# Patient Record
Sex: FEMALE | Race: ASIAN | NOT HISPANIC OR LATINO | Employment: OTHER | URBAN - METROPOLITAN AREA
[De-identification: names, ages, dates, MRNs, and addresses within clinical notes are randomized per-mention and may not be internally consistent; named-entity substitution may affect disease eponyms.]

---

## 2017-02-21 DIAGNOSIS — E03.9 ACQUIRED HYPOTHYROIDISM: ICD-10-CM

## 2017-02-21 DIAGNOSIS — E78.00 PURE HYPERCHOLESTEROLEMIA: ICD-10-CM

## 2017-02-21 DIAGNOSIS — Z76.0 ENCOUNTER FOR MEDICATION REFILL: Primary | ICD-10-CM

## 2017-02-21 DIAGNOSIS — E78.00 ELEVATED CHOLESTEROL: ICD-10-CM

## 2017-02-21 DIAGNOSIS — I10 BENIGN ESSENTIAL HYPERTENSION: ICD-10-CM

## 2017-02-21 RX ORDER — LEVOTHYROXINE SODIUM 25 UG/1
TABLET ORAL
Qty: 90 TABLET | Refills: 1 | Status: SHIPPED | OUTPATIENT
Start: 2017-02-21 | End: 2017-05-24

## 2017-02-21 RX ORDER — AMLODIPINE BESYLATE 5 MG/1
5 TABLET ORAL DAILY
Qty: 90 TABLET | Refills: 1 | Status: SHIPPED | OUTPATIENT
Start: 2017-02-21 | End: 2017-08-23

## 2017-02-22 RX ORDER — SIMVASTATIN 20 MG
TABLET ORAL
Qty: 90 TABLET | Refills: 1 | Status: SHIPPED | OUTPATIENT
Start: 2017-02-22 | End: 2017-08-23

## 2017-05-24 DIAGNOSIS — E03.9 ACQUIRED HYPOTHYROIDISM: ICD-10-CM

## 2017-05-24 RX ORDER — LEVOTHYROXINE SODIUM 25 UG/1
TABLET ORAL
Qty: 90 TABLET | Refills: 0 | Status: SHIPPED | OUTPATIENT
Start: 2017-05-24 | End: 2017-11-27

## 2017-05-25 DIAGNOSIS — Z76.0 ENCOUNTER FOR MEDICATION REFILL: Primary | ICD-10-CM

## 2017-05-26 RX ORDER — TRAMADOL HYDROCHLORIDE 50 MG/1
TABLET ORAL
Qty: 30 TABLET | Refills: 0 | Status: SHIPPED | OUTPATIENT
Start: 2017-05-26 | End: 2017-08-24

## 2017-05-30 DIAGNOSIS — N89.8 VAGINAL DRYNESS: ICD-10-CM

## 2017-05-31 RX ORDER — ESTRADIOL 0.1 MG/G
2 CREAM VAGINAL
Qty: 42.5 G | Refills: 10 | Status: SHIPPED | OUTPATIENT
Start: 2017-06-01 | End: 2021-05-19

## 2017-06-23 ENCOUNTER — TELEPHONE (OUTPATIENT)
Dept: FAMILY MEDICINE | Facility: CLINIC | Age: 62
End: 2017-06-23

## 2017-06-23 NOTE — TELEPHONE ENCOUNTER
Received documentation from [facility] that they have not been able to schedule a 6 month follow up on her mammogram from November.  Per patient she is not going to schedule anything now.  She is going to wait 1 year.  She is still paying off her charges from the November mammogram.  She can not afford any more charges at this time.    Kumar Jacinto,   [facility]  [phone]

## 2017-08-23 DIAGNOSIS — E78.00 ELEVATED CHOLESTEROL: ICD-10-CM

## 2017-08-23 DIAGNOSIS — I10 BENIGN ESSENTIAL HYPERTENSION: ICD-10-CM

## 2017-08-23 DIAGNOSIS — E78.00 PURE HYPERCHOLESTEROLEMIA: ICD-10-CM

## 2017-08-23 RX ORDER — AMLODIPINE BESYLATE 5 MG/1
TABLET ORAL
Qty: 90 TABLET | Refills: 0 | Status: SHIPPED | OUTPATIENT
Start: 2017-08-23 | End: 2017-11-27

## 2017-08-23 RX ORDER — SIMVASTATIN 20 MG
TABLET ORAL
Qty: 90 TABLET | Refills: 0 | Status: SHIPPED | OUTPATIENT
Start: 2017-08-23 | End: 2017-11-27

## 2017-08-24 DIAGNOSIS — Z76.0 ENCOUNTER FOR MEDICATION REFILL: ICD-10-CM

## 2017-08-24 RX ORDER — TRAMADOL HYDROCHLORIDE 50 MG/1
TABLET ORAL
Qty: 30 TABLET | Refills: 0 | Status: SHIPPED | OUTPATIENT
Start: 2017-08-24 | End: 2017-12-29

## 2017-09-13 DIAGNOSIS — Z76.0 ENCOUNTER FOR MEDICATION REFILL: ICD-10-CM

## 2017-09-13 RX ORDER — TRAMADOL HYDROCHLORIDE 50 MG/1
TABLET ORAL
Qty: 30 TABLET | Refills: 0 | OUTPATIENT
Start: 2017-09-13

## 2017-11-07 VITALS — SYSTOLIC BLOOD PRESSURE: 102 MMHG | DIASTOLIC BLOOD PRESSURE: 58 MMHG

## 2017-11-07 DIAGNOSIS — Z23 NEED FOR PROPHYLACTIC VACCINATION AND INOCULATION AGAINST INFLUENZA: Primary | ICD-10-CM

## 2017-11-07 PROCEDURE — 90686 IIV4 VACC NO PRSV 0.5 ML IM: CPT | Performed by: FAMILY MEDICINE

## 2017-11-07 PROCEDURE — 90471 IMMUNIZATION ADMIN: CPT | Performed by: FAMILY MEDICINE

## 2017-11-07 NOTE — PROGRESS NOTES
Injectable Influenza Immunization Documentation    1.  Is the person to be vaccinated sick today?   No    2. Does the person to be vaccinated have an allergy to a component   of the vaccine?   No  Egg Allergy Algorithm Link does not eat eggs    3. Has the person to be vaccinated ever had a serious reaction   to influenza vaccine in the past?   No    4. Has the person to be vaccinated ever had Guillain-Barré syndrome?   No    Form completed by Jacqueline Suarez MA November 7, 2017 2:37 PM

## 2017-11-13 ENCOUNTER — HOSPITAL ENCOUNTER (OUTPATIENT)
Dept: MAMMOGRAPHY | Facility: CLINIC | Age: 62
Discharge: HOME | End: 2017-11-13
Attending: FAMILY MEDICINE | Admitting: FAMILY MEDICINE
Payer: COMMERCIAL

## 2017-11-13 DIAGNOSIS — Z12.31 VISIT FOR SCREENING MAMMOGRAM: ICD-10-CM

## 2017-11-13 PROCEDURE — G0202 SCR MAMMO BI INCL CAD: HCPCS

## 2017-11-27 DIAGNOSIS — E03.9 ACQUIRED HYPOTHYROIDISM: ICD-10-CM

## 2017-11-27 DIAGNOSIS — E78.00 PURE HYPERCHOLESTEROLEMIA: ICD-10-CM

## 2017-11-27 DIAGNOSIS — E78.00 ELEVATED CHOLESTEROL: ICD-10-CM

## 2017-11-27 DIAGNOSIS — I10 BENIGN ESSENTIAL HYPERTENSION: ICD-10-CM

## 2017-11-27 RX ORDER — SIMVASTATIN 20 MG
20 TABLET ORAL DAILY
Qty: 90 TABLET | Refills: 0 | Status: SHIPPED | OUTPATIENT
Start: 2017-11-27 | End: 2017-12-29

## 2017-11-27 RX ORDER — LEVOTHYROXINE SODIUM 25 UG/1
TABLET ORAL
Qty: 90 TABLET | Refills: 0 | Status: SHIPPED | OUTPATIENT
Start: 2017-11-27 | End: 2017-12-29

## 2017-11-27 RX ORDER — AMLODIPINE BESYLATE 5 MG/1
TABLET ORAL
Qty: 90 TABLET | Refills: 0 | Status: SHIPPED | OUTPATIENT
Start: 2017-11-27 | End: 2017-12-29

## 2017-12-21 NOTE — PROGRESS NOTES
HCM:  Hep C     HO of Hysterectomy. Fibroid.  Last Mammo on 11/13/2017:  IMPRESSION: BI-RADS CATEGORY: 1 - Negative.    Last Colonoscopy on 10/9/2013:  Impression: Hemorrhoids Nos   Polyp    Patient is fasting, post fall injury 2 weeks, no forms needed to fill out    SUBJECTIVE:   CC: Monica Bryson is an 62 year old woman who presents for preventive health visit.     Laot[place]  No work outside the home    4 children, 2 with her now.26,29 yo  Pets dog, other dog pasted away last month. One cat.  House rambler  Drives sometimes        On HRT >10 years does not want to stop. Discussed risks. She understands these and accepts the risk of continuing this medication.  Concerned about cost of estrogen topical vaginal cream a compounded version is ordered at lower cost.    Tramadol contract, [employer]. [place] monitoring.    Wrist splint right CTS is helping    Healthy Habits:    Do you get at least three servings of calcium containing foods daily (dairy, green leafy vegetables, etc.)? yes    Amount of exercise or daily activities, outside of work: 2-3 day(s) per week    Problems taking medications regularly No    Medication side effects: No    Have you had an eye exam in the past two years? yes    Do you see a dentist twice per year? yes    Do you have sleep apnea, excessive snoring or daytime drowsiness?no      Musculoskeletal problem/left knee and left hip pain      Duration: Post fall 2 weeks/months, patient fell down three stairs outside, falling forward on concrete     Description    Location: left knee and left hip     Intensity:  Moderate worsen when walking up/sdown stairs, transitioning from sitting to standing     Accompanying signs and symptoms: radiation of pain to left hip, tingling in left foot, no numbness     History  Previous similar problem: no   Previous evaluation:  none    Precipitating or alleviating factors:  Trauma or overuse: YES, fall   Aggravating factors include: climbing stairs,  "transition from sitting to standing     Therapies tried and outcome: nothing    Inverting left shoe wearing    Sometimes cramping up left leg    Sometimes locking knee    Stairs going up hurts    Bowel/bladder no issues    Weakness in left leg \"giving out\"    No xray previous    3 years ago went to PT and it helped        Today's PHQ-2 Score: PHQ-2 ( 1999 Pfizer) 12/28/2016 12/22/2015   Q1: Little interest or pleasure in doing things 1 1   Q2: Feeling down, depressed or hopeless 1 1   PHQ-2 Score 2 2         Abuse: Current or Past(Physical, Sexual or Emotional)- No  Do you feel safe in your environment - Yes  Social History   Substance Use Topics     Smoking status: Never Smoker     Smokeless tobacco: Never Used     Alcohol use 0.0 oz/week     0 Standard drinks or equivalent per week      Comment: not at all     If you drink alcohol do you typically have >3 drinks per day or >7 drinks per week? No                     Reviewed orders with patient.  Reviewed health maintenance and updated orders accordingly - Yes  Labs reviewed in EPIC  BP Readings from Last 3 Encounters:   12/29/17 118/78   11/07/17 102/58   12/28/16 116/78    Wt Readings from Last 3 Encounters:   12/29/17 81.6 kg (180 lb)   12/28/16 80.3 kg (177 lb)   12/22/15 76.8 kg (169 lb 6.4 oz)       Estimated body mass index is 31.89 kg/(m^2) as calculated from the following:    Height as of this encounter: 1.6 m (5' 3\").    Weight as of this encounter: 81.6 kg (180 lb).  Weight loss recommended           Patient Active Problem List   Diagnosis     Hepatitis B infection     Anxiety     H/O: hysterectomy     Dyslipidemia     Health Care Home     Advanced directives, counseling/discussion     Hypothyroidism     Benign essential hypertension     Hypercholesteremia     Past Surgical History:   Procedure Laterality Date     TABSO  2-9-2007       Social History   Substance Use Topics     Smoking status: Never Smoker     Smokeless tobacco: Never Used     Alcohol use " "0.0 oz/week     0 Standard drinks or equivalent per week      Comment: not at all     No family history on file.      Current Outpatient Prescriptions   Medication Sig Dispense Refill     levothyroxine (SYNTHROID/LEVOTHROID) 25 MCG tablet Take 1 tablet by mouth daily 90 tablet 0     simvastatin (ZOCOR) 20 MG tablet Take 1 tablet (20 mg) by mouth daily 90 tablet 0     amLODIPine (NORVASC) 5 MG tablet TAKE 1 TABLET(5 MG) BY MOUTH DAILY 90 tablet 0     traMADol (ULTRAM) 50 MG tablet TAKE 1/2 TABLET BY MOUTH FOUR TIMES DAILY AS NEEDED (Patient not taking: Reported on 11/7/2017) 30 tablet 0     estradiol (ESTRACE VAGINAL) 0.1 MG/GM cream Place 2 g vaginally twice a week 42.5 g 10     TraMADol HCl 50 MG TBDP Take 25 mg by mouth 4 times daily as needed (Patient not taking: Reported on 11/7/2017) 30 tablet 0     estradiol 0.0375 MG/24HR PTWK UNWRAP AND APPLY 1 PATCH TO SKIN EVERY WEEK 12 patch 3     Cyanocobalamin (VITAMIN B 12 PO) Take 1 tablet by mouth every other day       vitamin B complex with vitamin C (VITAMIN  B COMPLEX) TABS tablet Take 1 tablet by mouth every other day       ascorbic acid (VITAMIN C) 500 MG tablet Take 1,000 mg by mouth three times a week       NEW MED Take 1 tablet by mouth twice a week Pt taking \"beet\" tablet and \"artichoke\" tablet twice weekly       ASPIRIN PO Take 81 mg by mouth daily       glucosamine-chondroitin 500-400 MG CAPS Take 1 capsule by mouth daily       Cholecalciferol (VITAMIN D) 2000 UNITS tablet Take two tabs for total of 4000 iu daily 100 tablet 3     Flaxseed, Linseed, (FLAX SEED OIL) 1000 MG capsule Take 1 capsule (1,000 mg) by mouth daily 100 capsule 1     Coenzyme Q10 (CO Q 10) 100 MG CAPS Take 100 mg by mouth daily 90 capsule 3     acetaminophen (TYLENOL) 500 MG tablet Take 1-2 tablets by mouth every 6 hours as needed.       No Known Allergies  Recent Labs   Lab Test  12/28/16   0919  12/22/15   0840  12/16/14   0917   01/16/12   0825  01/15/12   1205   A1C  5.8*   --    " --    --    --    --    LDL  123*  120*  187*   < >   --    --    HDL  62  56  56   < >   --    --    TRIG  208*  158*  146   < >   --    --    ALT  13  18  16   < >  29  7   CR  0.66  0.58  0.69   < >  0.70  0.57   GFRESTIMATED   --    --    --    --   87  >90   GFRESTBLACK   --    --    --    --   >90  >90   POTASSIUM  4.0  4.0  4.4   < >  3.4  3.5   TSH   --    --    --    --   3.57  1.13    < > = values in this interval not displayed.              Patient over age 50, mutual decision to screen reflected in health maintenance.      Pertinent mammograms are reviewed under the imaging tab.  History of abnormal Pap smear: Status post benign hysterectomy. Health Maintenance and Surgical History updated.    Reviewed and updated as needed this visit by clinical staff         Reviewed and updated as needed this visit by Provider        Past Medical History:   Diagnosis Date     Anxiety      Dyslipidemia      H/O: hysterectomy      Hepatitis B      HTN (hypertension)      Hypothyroidism      Osteoarthritis cervical spine     knees      Past Surgical History:   Procedure Laterality Date     TABSO  2007     Obstetric History       T0      L4     SAB0   TAB0   Ectopic0   Multiple0   Live Births0       # Outcome Date GA Lbr Syed/2nd Weight Sex Delivery Anes PTL Lv   5 AB            4 Para            3 Para            2 Para            1 Para                 Sleep awakening 3 hours on and off, some napping, nocturia x2  Appetite ok twice a day, fasting today  Exercise limited, just joined a club. Swimming hot tub and sauna    Smoking never  ETOH no  Street drugs/MJ no  Caffeine coffee, cocoa    Depression sometimes sad about hard time with communication with children, chronic pain  Anxiety off and on  Panic no  SI/SP no  Hallucinations no  Paranoid no  Manic no  OCD no  PTSD no  Gambling no  Guns no  Grief over dog that       ROS:  C: NEGATIVE for fever, chills, change in weight  I: NEGATIVE for worrisome  "rashes, moles or lesions  E: NEGATIVE for vision changes or irritation  ENT: NEGATIVE for ear, mouth and throat problems  R: NEGATIVE for significant cough or SOB  B: NEGATIVE for masses, tenderness or discharge  CV: NEGATIVE for chest pain, palpitations or peripheral edema  GI: NEGATIVE for nausea, abdominal pain, heartburn, or change in bowel habits  : NEGATIVE for unusual urinary or vaginal symptoms. No vaginal bleeding.  M: NEGATIVE for significant arthralgias or myalgia  N: NEGATIVE for weakness, dizziness or paresthesias  E: NEGATIVE for temperature intolerance, skin/hair changes  H: NEGATIVE for bleeding problems  P: NEGATIVE for changes in mood or affect   Left leg and back  Right CTS  Tension HA and tylenol  Migraine with aura 2-3/month Excedrin migraine  Eye exam due  Dental UTD  GERD occasional, watching what she is eating  NO CP  DOMINGO stairs sometimes  SOB fine at rest        OBJECTIVE:   /78  Pulse 74  Temp 97.5  F (36.4  C) (Oral)  Resp 16  Ht 1.6 m (5' 3\")  Wt 81.6 kg (180 lb)  SpO2 96%  BMI 31.89 kg/m2  EXAM:  GENERAL APPEARANCE: healthy, alert and no distress  EYES: Eyes grossly normal to inspection, PERRL and conjunctivae and sclerae normal  HENT: ear canals and TM's normal, nose and mouth without ulcers or lesions, oropharynx clear and oral mucous membranes moist  NECK: no adenopathy, no asymmetry, masses, or scars and thyroid normal to palpation  RESP: lungs clear to auscultation - no rales, rhonchi or wheezes  BREAST: normal without masses, tenderness or nipple discharge and no palpable axillary masses or adenopathy  CV: regular rate and rhythm, normal S1 S2, no S3 or S4, no murmur, click or rub, no peripheral edema and peripheral pulses strong  ABDOMEN: soft, nontender, no hepatosplenomegaly, no masses and bowel sounds normal  MS: no musculoskeletal defects are noted and gait is age appropriate without ataxia  Right wrist with splint for CTS  SKIN: no suspicious lesions or " rashes  NEURO: Normal strength and tone, sensory exam grossly normal, mentation intact and speech normal  PSYCH: mentation appears normal and affect normal/bright        ASSESSMENT/PLAN:       ICD-10-CM    1. Routine history and physical examination of adult Z00.00    2. Anxiety F41.9    3. Dyslipidemia E78.5 Lipid Panel ([facility])   4. H/O: hysterectomy Z90.710    5. Acquired hypothyroidism E03.9 TSH ([facility])     Thyroxine (T4) Free  Direct  S ([facility])     levothyroxine (SYNTHROID/LEVOTHROID) 25 MCG tablet   6. Benign essential hypertension I10 Comp. Metabolic Panel (14) ([facility])     amLODIPine (NORVASC) 5 MG tablet   7. Vitamin D deficiency E55.9 Vitamin D  25-Hydroxy ([facility])   8. Iron deficiency anemia, unspecified iron deficiency anemia type D50.9 CBC with Diff/Plt ([facility])   9. Dysuria R30.0 Urinalysis w/reflex protein, bili ([facility])   10. Need for hepatitis C screening test Z11.59 HCV Antibody ([facility])   11. BMI 31.0-31.9,adult Z68.31 Hemoglobin A1C ([facility])   12. Screening for metabolic disorder Z13.228 Hemoglobin A1C ([facility])   13. Vaginal dryness N89.8 COMPOUNDED NON-CONTROLLED SUBSTANCE ([facility] RX) - PHARMACY TO MIX COMPOUNDED MEDICATION   14. Pure hypercholesterolemia E78.00 simvastatin (ZOCOR) 20 MG tablet   15. Elevated cholesterol E78.00 simvastatin (ZOCOR) 20 MG tablet   16. Chronic pain syndrome G89.4 ToxASSURE Urine Drug Screen ([facility])   17. Chronic neck pain M54.2     G89.29    18. Chronic left-sided low back pain without sciatica M54.5     G89.29    19. Pain of left lower extremity M79.605 tiZANidine (ZANAFLEX) 4 MG tablet     predniSONE (DELTASONE) 20 MG tablet   20. Encounter for medication refill Z76.0 traMADol (ULTRAM) 50 MG tablet       COUNSELING:   Reviewed preventive health counseling, as reflected in patient instructions       Regular exercise       Healthy diet/nutrition       Vision screening       Advance Care Planning    BP Readings from Last 3 Encounters:   12/29/17 118/78  "  11/07/17 102/58   12/28/16 116/78          reports that she has never smoked. She has never used smokeless tobacco.    Estimated body mass index is 30.86 kg/(m^2) as calculated from the following:    Height as of 12/28/16: 1.613 m (5' 3.5\").    Weight as of 12/28/16: 80.3 kg (177 lb).   Weight management plan: Discussed healthy diet and exercise guidelines and patient will follow up in 12 months in clinic to re-evaluate.     ASSESSMENT / PLAN:  (Z00.00) Routine history and physical examination of adult  (primary encounter diagnosis)  Comment:   Plan: f/u 1 year    (F41.9) Anxiety  Comment:   Plan: continue cares. Discussed relaxation ideas.    (E78.5) Dyslipidemia  Comment:   Plan: Lipid Panel ([facility])            (Z90.710) H/O: hysterectomy  Comment:   Plan: observe    (E03.9) Acquired hypothyroidism  Comment:   Plan: TSH ([facility]), Thyroxine (T4) Free  Direct  S         ([facility]), levothyroxine (SYNTHROID/LEVOTHROID)        25 MCG tablet            (I10) Benign essential hypertension  Comment:   BP Readings from Last 3 Encounters:   12/29/17 118/78   11/07/17 102/58   12/28/16 116/78       Plan: Comp. Metabolic Panel (14) ([facility]),         amLODIPine (NORVASC) 5 MG tablet            (E55.9) Vitamin D deficiency  Comment:   Plan: Vitamin D  25-Hydroxy ([facility])            (D50.9) Iron deficiency anemia, unspecified iron deficiency anemia type  Comment:   Plan: CBC with Diff/Plt ([facility])            (R30.0) Dysuria  Comment:   Plan: Urinalysis w/reflex protein, bili ([facility])            (Z11.59) Need for hepatitis C screening test  Comment:   Plan: HCV Antibody ([facility])            (Z68.31) BMI 31.0-31.9,adult  Comment:   Plan: Hemoglobin A1C ([facility]) diabetes screen at her request        Weight loss recommended.     (Z13.228) Screening for metabolic disorder  Comment:   Plan: Hemoglobin A1C ([facility])            (N89.8) Vaginal dryness  Comment:   Plan: COMPOUNDED NON-CONTROLLED SUBSTANCE ([facility] RX) -        PHARMACY TO " MIX COMPOUNDED MEDICATION        Discussed pro/con of HRT    (E78.00) Pure hypercholesterolemia  Comment:   Plan: simvastatin (ZOCOR) 20 MG tablet            (E78.00) Elevated cholesterol  Comment:   Plan: simvastatin (ZOCOR) 20 MG tablet            (G89.4) Chronic pain syndrome  Comment:   Plan: ToxASSURE Urine Drug Screen ([facility])            (M54.2,  G89.29) Chronic neck pain  Comment:   Plan: option for PT    (M54.5,  G89.29) Chronic left-sided low back pain without sciatica  Comment:   Plan: Option for PT    (M79.605) Pain of left lower extremity  Comment:   Plan: tiZANidine (ZANAFLEX) 4 MG tablet, predniSONE         (DELTASONE) 20 MG tablet            (Z76.0) Encounter for medication refill  Comment:   Plan: traMADol (ULTRAM) 50 MG tablet                Counseling Resources:  ATP IV Guidelines  Pooled Cohorts Equation Calculator  Breast Cancer Risk Calculator  FRAX Risk Assessment  ICSI Preventive Guidelines  Dietary Guidelines for Americans, 2010  USDA's MyPlate  ASA Prophylaxis  Lung CA Screening    Angeles Johnson MD  [facility]

## 2017-12-21 NOTE — PATIENT INSTRUCTIONS

## 2017-12-29 ENCOUNTER — OFFICE VISIT (OUTPATIENT)
Dept: FAMILY MEDICINE | Facility: CLINIC | Age: 62
End: 2017-12-29

## 2017-12-29 VITALS
RESPIRATION RATE: 16 BRPM | HEIGHT: 63 IN | DIASTOLIC BLOOD PRESSURE: 78 MMHG | HEART RATE: 74 BPM | SYSTOLIC BLOOD PRESSURE: 118 MMHG | OXYGEN SATURATION: 96 % | TEMPERATURE: 97.5 F | BODY MASS INDEX: 31.89 KG/M2 | WEIGHT: 180 LBS

## 2017-12-29 DIAGNOSIS — M54.50 CHRONIC LEFT-SIDED LOW BACK PAIN WITHOUT SCIATICA: ICD-10-CM

## 2017-12-29 DIAGNOSIS — N89.8 VAGINAL DRYNESS: ICD-10-CM

## 2017-12-29 DIAGNOSIS — E78.00 PURE HYPERCHOLESTEROLEMIA: ICD-10-CM

## 2017-12-29 DIAGNOSIS — E78.00 ELEVATED CHOLESTEROL: ICD-10-CM

## 2017-12-29 DIAGNOSIS — I10 BENIGN ESSENTIAL HYPERTENSION: ICD-10-CM

## 2017-12-29 DIAGNOSIS — Z11.59 NEED FOR HEPATITIS C SCREENING TEST: ICD-10-CM

## 2017-12-29 DIAGNOSIS — G89.29 CHRONIC LEFT-SIDED LOW BACK PAIN WITHOUT SCIATICA: ICD-10-CM

## 2017-12-29 DIAGNOSIS — E03.9 ACQUIRED HYPOTHYROIDISM: ICD-10-CM

## 2017-12-29 DIAGNOSIS — E78.5 DYSLIPIDEMIA: ICD-10-CM

## 2017-12-29 DIAGNOSIS — Z00.00 ROUTINE HISTORY AND PHYSICAL EXAMINATION OF ADULT: Primary | ICD-10-CM

## 2017-12-29 DIAGNOSIS — Z76.0 ENCOUNTER FOR MEDICATION REFILL: ICD-10-CM

## 2017-12-29 DIAGNOSIS — M79.605 PAIN OF LEFT LOWER EXTREMITY: ICD-10-CM

## 2017-12-29 DIAGNOSIS — M54.2 CHRONIC NECK PAIN: ICD-10-CM

## 2017-12-29 DIAGNOSIS — Z90.710 H/O: HYSTERECTOMY: ICD-10-CM

## 2017-12-29 DIAGNOSIS — F41.9 ANXIETY: ICD-10-CM

## 2017-12-29 DIAGNOSIS — G89.4 CHRONIC PAIN SYNDROME: ICD-10-CM

## 2017-12-29 DIAGNOSIS — D50.9 IRON DEFICIENCY ANEMIA, UNSPECIFIED IRON DEFICIENCY ANEMIA TYPE: ICD-10-CM

## 2017-12-29 DIAGNOSIS — Z13.228 SCREENING FOR METABOLIC DISORDER: ICD-10-CM

## 2017-12-29 DIAGNOSIS — R30.0 DYSURIA: ICD-10-CM

## 2017-12-29 DIAGNOSIS — G89.29 CHRONIC NECK PAIN: ICD-10-CM

## 2017-12-29 DIAGNOSIS — E55.9 VITAMIN D DEFICIENCY: ICD-10-CM

## 2017-12-29 LAB
% GRANULOCYTES: 54.1 % (ref 42.2–75.2)
BACTERIA URINE: NORMAL
BILIRUB UR QL STRIP: 0
BLOOD URINE DIP: 0
CASTS/LPF: NORMAL
COLOR UR: YELLOW
CRYSTAL URINE: NORMAL
EPITHELIAL CELLS - QUEST: NORMAL
GLUCOSE UR STRIP-MCNC: 0 MG/DL
HCT VFR BLD AUTO: 40.8 % (ref 35–46)
HEMOGLOBIN: 13.3 G/DL (ref 11.8–15.5)
KETONES UR QL STRIP: 0
LEUKOCYTE ESTERASE URINE DIP: 0
LYMPHOCYTES NFR BLD AUTO: 37.5 % (ref 20.5–51.1)
MCH RBC QN AUTO: 28.1 PG (ref 27–31)
MCHC RBC AUTO-ENTMCNC: 32.6 G/DL (ref 33–37)
MCV RBC AUTO: 86.1 FL (ref 80–100)
MONOCYTES NFR BLD AUTO: 8.4 % (ref 1.7–9.3)
MUCOUS URINE: NORMAL
NITRITE UR QL STRIP: NORMAL
PH UR STRIP: 6.5 PH (ref 5–9)
PLATELET # BLD AUTO: 337 K/UL (ref 140–450)
PROT UR QL: 0 MG/DL (ref ?–0.01)
RBC # BLD AUTO: 4.74 X10/CMM (ref 3.7–5.2)
RBC URINE: NORMAL (ref 0–3)
SP GR UR STRIP: 1.01 (ref 1–1.02)
UROBILINOGEN UR QL STRIP: 0.2 EU/DL (ref 0.2–1)
WBC # BLD AUTO: 6.9 X10/CMM (ref 3.8–11)
WBC URINE: NORMAL (ref 0–3)

## 2017-12-29 PROCEDURE — 36415 COLL VENOUS BLD VENIPUNCTURE: CPT | Performed by: FAMILY MEDICINE

## 2017-12-29 PROCEDURE — 86803 HEPATITIS C AB TEST: CPT | Mod: 90 | Performed by: FAMILY MEDICINE

## 2017-12-29 PROCEDURE — 80050 GENERAL HEALTH PANEL: CPT | Mod: 90 | Performed by: FAMILY MEDICINE

## 2017-12-29 PROCEDURE — 82306 VITAMIN D 25 HYDROXY: CPT | Mod: 90 | Performed by: FAMILY MEDICINE

## 2017-12-29 PROCEDURE — 83036 HEMOGLOBIN GLYCOSYLATED A1C: CPT | Mod: 90 | Performed by: FAMILY MEDICINE

## 2017-12-29 PROCEDURE — 84439 ASSAY OF FREE THYROXINE: CPT | Mod: 90 | Performed by: FAMILY MEDICINE

## 2017-12-29 PROCEDURE — 80061 LIPID PANEL: CPT | Mod: 90 | Performed by: FAMILY MEDICINE

## 2017-12-29 PROCEDURE — 99396 PREV VISIT EST AGE 40-64: CPT | Performed by: FAMILY MEDICINE

## 2017-12-29 PROCEDURE — 81003 URINALYSIS AUTO W/O SCOPE: CPT | Performed by: FAMILY MEDICINE

## 2017-12-29 RX ORDER — AMLODIPINE BESYLATE 5 MG/1
TABLET ORAL
Qty: 90 TABLET | Refills: 0 | Status: SHIPPED | OUTPATIENT
Start: 2017-12-29 | End: 2018-02-26

## 2017-12-29 RX ORDER — SIMVASTATIN 20 MG
20 TABLET ORAL DAILY
Qty: 90 TABLET | Refills: 0 | Status: SHIPPED | OUTPATIENT
Start: 2017-12-29 | End: 2018-02-26

## 2017-12-29 RX ORDER — PREDNISONE 20 MG/1
20 TABLET ORAL DAILY
Qty: 5 TABLET | Refills: 0 | Status: SHIPPED | OUTPATIENT
Start: 2017-12-29 | End: 2020-05-05

## 2017-12-29 RX ORDER — ESTRADIOL 0.1 MG/G
2 CREAM VAGINAL
Qty: 42.5 G | Refills: 10 | Status: CANCELLED | OUTPATIENT
Start: 2018-01-01

## 2017-12-29 RX ORDER — TRAMADOL HYDROCHLORIDE 50 MG/1
TABLET ORAL
Qty: 30 TABLET | Refills: 1 | Status: SHIPPED | OUTPATIENT
Start: 2017-12-29 | End: 2018-12-31

## 2017-12-29 RX ORDER — LEVOTHYROXINE SODIUM 25 UG/1
TABLET ORAL
Qty: 90 TABLET | Refills: 0 | Status: SHIPPED | OUTPATIENT
Start: 2017-12-29 | End: 2018-02-26

## 2017-12-29 NOTE — PROGRESS NOTES
Per VO Dr Johnson prescription sent to  compounding pharmacy for estrogen cream.  Bernadine Montaño

## 2017-12-29 NOTE — MR AVS SNAPSHOT
After Visit Summary   12/29/2017    Monica Bryson    MRN: [id number]           Patient Information     Date Of Birth          1955        Visit Information        Provider Department      12/29/2017 7:45 AM Angeles Johnson MD [facility]        Today's Diagnoses     Routine history and physical examination of adult    -  1    Anxiety        Dyslipidemia        H/O: hysterectomy        Acquired hypothyroidism        Benign essential hypertension        Vitamin D deficiency        Iron deficiency anemia, unspecified iron deficiency anemia type        Dysuria        Need for hepatitis C screening test        BMI 31.0-31.9,adult        Screening for metabolic disorder        Vaginal dryness        Pure hypercholesterolemia        Elevated cholesterol        Chronic pain syndrome        Chronic neck pain        Chronic left-sided low back pain without sciatica        Pain of left lower extremity        Encounter for medication refill          Care Instructions      Preventive Health Recommendations  Female Ages 50 - 64    Yearly exam: See your health care provider every year in order to  o Review health changes.   o Discuss preventive care.    o Review your medicines if your doctor has prescribed any.      Get a Pap test every three years (unless you have an abnormal result and your provider advises testing more often).    If you get Pap tests with HPV test, you only need to test every 5 years, unless you have an abnormal result.     You do not need a Pap test if your uterus was removed (hysterectomy) and you have not had cancer.    You should be tested each year for STDs (sexually transmitted diseases) if you're at risk.     Have a mammogram every 1 to 2 years.    Have a colonoscopy at age 50, or have a yearly FIT test (stool test). These exams screen for colon cancer.      Have a cholesterol test every 5 years, or more often if advised.    Have a diabetes test (fasting  glucose) every three years. If you are at risk for diabetes, you should have this test more often.     If you are at risk for osteoporosis (brittle bone disease), think about having a bone density scan (DEXA).    Shots: Get a flu shot each year. Get a tetanus shot every 10 years.    Nutrition:     Eat at least 5 servings of fruits and vegetables each day.    Eat whole-grain bread, whole-wheat pasta and brown rice instead of white grains and rice.    Talk to your provider about Calcium and Vitamin D.     Lifestyle    Exercise at least 150 minutes a week (30 minutes a day, 5 days a week). This will help you control your weight and prevent disease.    Limit alcohol to one drink per day.    No smoking.     Wear sunscreen to prevent skin cancer.     See your dentist every six months for an exam and cleaning.    See your eye doctor every 1 to 2 years.      Labs done  Refills done  Contract and urine for Tramadol. Printed script given to patient. May need a prior authorization  Schedule Physical therapy at the             Follow-ups after your visit        Who to contact     If you have questions or need follow up information about today's clinic visit or your schedule please contact [facility] directly at [phone].  Normal or non-critical lab and imaging results will be communicated to you by Lei[employer], letter or phone within 4 business days after the clinic has received the results. If you do not hear from us within 7 days, please contact the clinic through Lei[employer]ohart or phone. If you have a critical or abnormal lab result, we will notify you by phone as soon as possible.  Submit refill requests through [employer] or call your pharmacy and they will forward the refill request to us. Please allow 3 business days for your refill to be completed.          Additional Information About Your Visit        [employer] Information     [employer] lets you send messages to your doctor, view your test results, renew your  "prescriptions, schedule appointments and more. To sign up, go to [email or web address] . Click on \"Log in\" on the left side of the screen, which will take you to the Welcome page. Then click on \"Sign up Now\" on the right side of the page.     You will be asked to enter the access code listed below, as well as some personal information. Please follow the directions to create your username and password.     Your access code is: [email or web address]  Expires: 3/29/2018  8:47 AM     Your access code will  in 90 days. If you need help or a new code, please call your [facility] clinic or [phone].        Care EveryWhere ID     This is your Care EveryWhere ID. This could be used by other organizations to access your [facility] medical records  [id number]        Your Vitals Were     Pulse Temperature Respirations Height Pulse Oximetry BMI (Body Mass Index)    74 97.5  F (36.4  C) (Oral) 16 1.6 m (5' 3\") 96% 31.89 kg/m2       Blood Pressure from Last 3 Encounters:   17 118/78   17 102/58   16 116/78    Weight from Last 3 Encounters:   17 81.6 kg (180 lb)   16 80.3 kg (177 lb)   12/22/15 76.8 kg (169 lb 6.4 oz)              We Performed the Following     CBC with Diff/Plt ([facility])     Comp. Metabolic Panel (14) ([facility])     HCV Antibody ([facility])     Hemoglobin A1C ([facility])     Lipid Panel ([facility])     Thyroxine (T4) Free  Direct  S ([facility])     ToxASSURE Urine Drug Screen ([facility])     TSH ([facility])     Urinalysis w/reflex protein, bili ([facility])     Vitamin D  25-Hydroxy ([facility])          Today's Medication Changes          These changes are accurate as of: 17  9:43 AM.  If you have any questions, ask your nurse or doctor.               Start taking these medicines.        Dose/Directions    COMPOUNDED NON-CONTROLLED SUBSTANCE - PHARMACY TO MIX COMPOUNDED MEDICATION   Commonly known as:  CMPD RX   Used for:  Vaginal dryness   Started by:  Angeles Johnson MD        Estradiol 0.02% " cream HRT base   Quantity:  30 g   Refills:  3       predniSONE 20 MG tablet   Commonly known as:  DELTASONE   Used for:  Pain of left lower extremity   Started by:  Angeles Johnson MD        Dose:  20 mg   Take 1 tablet (20 mg) by mouth daily   Quantity:  5 tablet   Refills:  0       tiZANidine 4 MG tablet   Commonly known as:  ZANAFLEX   Used for:  Pain of left lower extremity   Started by:  Angeles Johnson MD        Dose:  4-8 mg   Take 1-2 tablets (4-8 mg) by mouth 3 times daily as needed for muscle spasms   Quantity:  30 tablet   Refills:  1         These medicines have changed or have updated prescriptions.        Dose/Directions    * TraMADol HCl 50 MG Tbdp   This may have changed:  Another medication with the same name was changed. Make sure you understand how and when to take each.   Used for:  Encounter for medication refill   Changed by:  Bel Ty MD        Dose:  25 mg   Take 25 mg by mouth 4 times daily as needed   Quantity:  30 tablet   Refills:  0       * traMADol 50 MG tablet   Commonly known as:  ULTRAM   This may have changed:  additional instructions   Used for:  Encounter for medication refill   Changed by:  Angeles Johnson MD        TAKE 1/2-1 TABLET BY MOUTH FOUR TIMES DAILY AS NEEDED   Quantity:  30 tablet   Refills:  1       * Notice:  This list has 2 medication(s) that are the same as other medications prescribed for you. Read the directions carefully, and ask your doctor or other care provider to review them with you.         Where to get your medicines      These medications were sent to [facility] - [place], [place] - [address] [place] [address]  [address] [facility] [address]     Phone:  [phone]     COMPOUNDED NON-CONTROLLED SUBSTANCE - PHARMACY TO MIX COMPOUNDED MEDICATION         These medications were sent to [facility] [address] [facility] [address], [place] [address]      Phone:  [phone]     amLODIPine 5 MG tablet    levothyroxine 25 MCG tablet    predniSONE 20 MG tablet    simvastatin 20 MG tablet    tiZANidine 4 MG tablet         Some of these will need a paper prescription and others can be bought over the counter.  Ask your nurse if you have questions.     Bring a paper prescription for each of these medications     traMADol 50 MG tablet                Primary Care Provider Office Phone # Fax #    Bel Gwen Ty [phone]       [facility] [address]  Formerly named [facility] [address]        Equal Access to Services     [facility]: Hadii aad ku hadasho Soomaali, waaxda luqadaha, qaybta kaalmada adeegyada, waxwendy mcqueen hayaan jay haynes . So [facility] [phone].    ATENCIÓN: Si habla español, tiene a ortiz disposición servicios gratuitos de asistencia lingüística. [facility] [phone].    We comply with applicable federal civil rights laws and [place] laws. We do not discriminate on the basis of race, color, national origin, age, disability, sex, sexual orientation, or gender identity.            Thank you!     Thank you for choosing [facility]  for your care. Our goal is always to provide you with excellent care. Hearing back from our patients is one way we can continue to improve our services. Please take a few minutes to complete the written survey that you may receive in the mail after your visit with us. Thank you!             Your Updated Medication List - Protect others around you: Learn how to safely use, store and throw away your medicines at www.disposemymeds.org.          This list is accurate as of: 12/29/17  9:43 AM.  Always use your most recent med list.                   Brand Name Dispense Instructions for use Diagnosis    acetaminophen 500 MG tablet    TYLENOL     Take 1-2 tablets by mouth every 6 hours as needed.        amLODIPine 5 MG tablet    NORVASC    90 tablet    TAKE 1 TABLET(5 MG) BY MOUTH  "DAILY    Benign essential hypertension       ascorbic acid 500 MG tablet    VITAMIN C     Take 1,000 mg by mouth three times a week        ASPIRIN PO      Take 81 mg by mouth daily        Co Q 10 100 MG Caps     90 capsule    Take 100 mg by mouth daily    Advanced directives, counseling/discussion       COMPOUNDED NON-CONTROLLED SUBSTANCE - PHARMACY TO MIX COMPOUNDED MEDICATION    CMPD RX    30 g    Estradiol 0.02% cream HRT base    Vaginal dryness       estradiol 0.0375 MG/24HR Ptwk     12 patch    UNWRAP AND APPLY 1 PATCH TO SKIN EVERY WEEK    Encounter for medication refill       estradiol 0.1 MG/GM cream    ESTRACE VAGINAL    42.5 g    Place 2 g vaginally twice a week    Vaginal dryness       flax seed oil 1000 MG capsule     100 capsule    Take 1 capsule (1,000 mg) by mouth daily        glucosamine-chondroitin 500-400 MG Caps per capsule      Take 1 capsule by mouth daily    Left knee pain       levothyroxine 25 MCG tablet    SYNTHROID/LEVOTHROID    90 tablet    Take 1 tablet by mouth daily    Acquired hypothyroidism       NEW MED      Take 1 tablet by mouth twice a week Pt taking \"beet\" tablet and \"artichoke\" tablet twice weekly        predniSONE 20 MG tablet    DELTASONE    5 tablet    Take 1 tablet (20 mg) by mouth daily    Pain of left lower extremity       simvastatin 20 MG tablet    ZOCOR    90 tablet    Take 1 tablet (20 mg) by mouth daily    Pure hypercholesterolemia, Elevated cholesterol       tiZANidine 4 MG tablet    ZANAFLEX    30 tablet    Take 1-2 tablets (4-8 mg) by mouth 3 times daily as needed for muscle spasms    Pain of left lower extremity       * TraMADol HCl 50 MG Tbdp     30 tablet    Take 25 mg by mouth 4 times daily as needed    Encounter for medication refill       * traMADol 50 MG tablet    ULTRAM    30 tablet    TAKE 1/2-1 TABLET BY MOUTH FOUR TIMES DAILY AS NEEDED    Encounter for medication refill       VITAMIN B 12 PO      Take 1 tablet by mouth every other day        vitamin B " complex with vitamin C Tabs tablet      Take 1 tablet by mouth every other day        vitamin D 2000 UNITS tablet     100 tablet    Take two tabs for total of 4000 iu daily    Vitamin D deficiency       * Notice:  This list has 2 medication(s) that are the same as other medications prescribed for you. Read the directions carefully, and ask your doctor or other care provider to review them with you.

## 2017-12-29 NOTE — LETTER
[facility]    12/29/17    Patient: Monica Bryson  YOB: 1955  Medical Record Number: [id number]                                                                  Controlled Substance Agreement  I understand that my care provider has prescribed controlled substances (narcotics, tranquilizers, and/or stimulants) to help manage my condition(s).  I am taking this medicine to help me function or work.  I know that this is strong medicine.  It could have serious side effects and even cause a dependency on the drug.  If I stop these medicines suddenly, I could have severe withdrawal symptoms.    The risks, benefits, and side effects of these medication(s) were explained to me.  I agree that:  1. I will take part in other treatments as advised by my provider.  This may be psychiatry or counseling, physical therapy, behavioral therapy, group treatment, or a referral to a pain clinic.  I will reduce or stop my medicine when my provider tells me to do so.   2. I will take my medicines as prescribed.  I will not change the dose or schedule unless my provider tells me to.  There will be no refills if I  run out early.   I may be contacted at any time without warning and asked to complete a drug test or pill count.   3. I will keep all my appointments at the clinic.  If I miss appointments or fail to follow instructions, my provider may stop my medicine.  4. I will not ask other providers to prescribe controlled substances. And I will not accept controlled substances from other people. If I need another prescribed controlled substance for a new reason, I will notify my provider within one business day.  5. If I enroll in the [place] Medical Marijuana program, I will tell my provider.  I will also sign an agreement to share my medical records with my provider.  6. I will use one pharmacy to fill all of my controlled substance prescriptions.  If my prescription is mailed to my pharmacy, it may  take 5 to 7 days for my medicine to be ready.  7. I understand that my provider, clinic care team, and pharmacy can track controlled substance prescriptions from other providers through a central database (prescription monitoring program).  8. I will bring in my list of medications (or my medicine bottles) each time I come to the clinic.  REV-  04/2016                                                                                                                                            Page 1 of 2      [facility]    12/29/17    Patient: Monica Bryson  YOB: 1955  Medical Record Number: [id number]    9. Refills of controlled substances will be made only during office hours.  It is up to me to make sure that I do not run out of my medicines on weekends or holidays.    10. I am responsible for my prescriptions.  If the medicine is lost or stolen, it will not be replaced.   I also agree not to share these medicines with anyone.  11. I agree to not use ANY illegal or recreational drugs.  This includes marijuana, cocaine, bath salts or other drugs.  I agree not to use alcohol unless my provider says I may.  I agree to give urine samples whenever asked.  If I fail to give a urine sample, the provider may stop my medicine.     12. I will tell my nurse or provider right away if I become pregnant or have a new medical problem treated outside of [facility].  13. I understand that this medicine can affect my thinking and judgment.  It may be unsafe for me to drive, use machinery and do dangerous tasks.  I will not do any of these things until I know how the medicine affects me.  If my dose changes, I will wait to see how it affects me.  I will contact my provider if I have concerns about medicine side effects.  I understand that if I do not follow any of the conditions above, my prescriptions or treatment may be stopped.    I agree that my provider, clinic care team, and pharmacy may  work with any city, state or federal law enforcement agency that investigates the misuse, sale, or other diversion of my controlled medicine. I will allow my provider to discuss my care with or share a copy of this agreement with any other treating provider, pharmacy or emergency room where I receive care.  I agree to give up (waive) any right of privacy or confidentiality with respect to these authorizations.   I have read this agreement and have asked questions about anything I did not understand.   ___________________________________    ___________________________  Patient Signature                                                           Date and Time  ___________________________________     ____________________________  Witness                                                                            Date and Time  ___________________________________  Angeles Johnson MD  REV-  04/2016                                                                                                                                                                 Page 2 of 2

## 2017-12-29 NOTE — LETTER
[facility]  [address] [place]  [address]  Phone: [phone]    January 5, 2018      Monica Cespedesrk  [address] [facility] [address]              Dear Monica,    The results from your recent visit showed A1c is in normal range  CMP results were ok  Lipids are high still. Your Zocor is listed as 20 mg daily. We could consider an increased dose. Let me know if you want to do this.  TSH is high, free T4 is ok. Option to add a small dose of thyroid medication and recheck in 8 weeks. I have it listed that you are on 25 mcg daily. Let me know if you want to try a small increase.  Hepatitis C screen was negative  Vitamin D level is low. Take an over the counter supplement 2000 IU daily.        Sincerely,     Angeles Johnson M.D.    Results for orders placed or performed in visit on 12/29/17   Comp. Metabolic Panel (14) ([facility])   Result Value Ref Range    Glucose 90 65 - 99 mg/dL    Urea Nitrogen 11 8 - 27 mg/dL    Creatinine 0.84 0.57 - 1.00 mg/dL    eGFR If NonAfricn Am 76 >59 mL/min/1.73    eGFR If Africn Am 87 >59 mL/min/1.73    BUN/Creatinine Ratio 13 12 - 28    Sodium 141 134 - 144 mmol/L    Potassium 4.5 3.5 - 5.2 mmol/L    Chloride 97 96 - 106 mmol/L    Total CO2 25 18 - 28 mmol/L    Calcium 9.9 8.7 - 10.3 mg/dL    Protein Total 8.0 6.0 - 8.5 g/dL    Albumin 4.9 (H) 3.6 - 4.8 g/dL    Globulin, Total 3.1 1.5 - 4.5 g/dL    A/G Ratio 1.6 1.2 - 2.2    Bilirubin Total 0.5 0.0 - 1.2 mg/dL    Alkaline Phosphatase 100 39 - 117 IU/L    AST 21 0 - 40 IU/L    ALT 25 0 - 32 IU/L    Narrative    Performed at:  01 - [facility] [address], [place], [place]  [id number]  : Kenyon Moore MD, Phone:  [phone]   Lipid Panel ([facility])   Result Value Ref Range    Cholesterol 242 (H) 100 - 199 mg/dL    Triglycerides 194 (H) 0 - 149 mg/dL    HDL Cholesterol 61 >39 mg/dL    VLDL Cholesterol Matthias 39 5 - 40 mg/dL    LDL Cholesterol Calculated 142 (H) 0 - 99 mg/dL     LDL/HDL Ratio 2.3 0.0 - 3.2 ratio units    Narrative    Performed at:  01 - [facility] [address], [place], [place]  [id number]  : Kenyon Moore MD, Phone:  [phone]   CBC with Diff/Plt ([facility])   Result Value Ref Range    WBC x10/cmm 6.9 3.8 - 11.0 x10/cmm    % Lymphocytes 37.5 20.5 - 51.1 %    % Monocytes 8.4 1.7 - 9.3 %    % Granulocytes 54.1 42.2 - 75.2 %    RBC x10/cmm 4.74 3.7 - 5.2 x10/cmm    Hemoglobin 13.3 11.8 - 15.5 g/dl    Hematocrit 40.8 35 - 46 %    MCV 86.1 80 - 100 fL    MCH 28.1 27.0 - 31.0 pg    MCHC 32.6 (A) 33.0 - 37.0 g/dL    Platelet Count 337 140 - 450 K/uL   TSH ([facility])   Result Value Ref Range    TSH 6.370 (H) 0.450 - 4.500 uIU/mL    Narrative    Performed at:  01 - [facility] [address], [place], [place]  [id number]  : Kenyon Moore MD, Phone:  [phone]   Thyroxine (T4) Free  Direct  S ([facility])   Result Value Ref Range    T4 Free 1.22 0.82 - 1.77 ng/dL    Narrative    Performed at:  01 - [facility] [address], [place], [place]  [id number]  : Kenyon Moore MD, Phone:  [phone]   Urinalysis w/reflex protein, bili ([facility])   Result Value Ref Range    Color Urine Yellow     pH Urine 6.5 5 - 9 pH    Specific Gravity Urine 1.010 1.005 - 1.025    Protein Urine 0 0.01 mg/dL    Glucose Urine 0     Ketones Urine 0     Leukocyte Esterase Urine 0     Blood Urine 0     Nitrite Urine Neg NEG    Bilirubin Urine Dip 0     Urobilinogen Urine 0.2 0.2 - 1.0 EU/dL    WBC Urine  0 - 3    RBC Urine  0 - 3    Epithelial Cells      Crystal Urine      Bacteria Urine      Mucous Urine      Casts/LPF     HCV Antibody ([facility])   Result Value Ref Range    Hep C Virus Ab <0.1 0.0 - 0.9 s/co ratio    Narrative    Performed at:  01 - [facility] [address], [place], [place]  [id number]  : Kenyon Moore MD, Phone:  [phone]   Vitamin D  25-Hydroxy ([facility])   Result Value Ref Range    Vitamin D,25-Hydroxy 22.3 (L) 30.0 - 100.0 ng/mL     Narrative    Performed at:  01 - [facility] [address], [place], [place]  [id number]  : Kenyon Moore MD, Phone:  [phone]   Hemoglobin A1C ([facility])   Result Value Ref Range    Hemoglobin A1C 5.6 4.8 - 5.6 %    Narrative    Performed at:  01 - [facility] [address], [place], [place]  [id number]  : Kenyon Moore MD, Phone:  [phone]   ToxASSURE Urine Drug Screen ([facility])   Result Value Ref Range    Summary of Drugs Identified FINAL     Narrative    Performed at:  01 - [facility]  [address] [place] [address]  [id number]  : Gerard Mtz MD, Phone:  [phone]

## 2017-12-30 LAB
ALBUMIN SERPL-MCNC: 4.9 G/DL (ref 3.6–4.8)
ALBUMIN/GLOB SERPL: 1.6 {RATIO} (ref 1.2–2.2)
ALP SERPL-CCNC: 100 IU/L (ref 39–117)
ALT SERPL-CCNC: 25 IU/L (ref 0–32)
AST SERPL-CCNC: 21 IU/L (ref 0–40)
BILIRUB SERPL-MCNC: 0.5 MG/DL (ref 0–1.2)
BUN SERPL-MCNC: 11 MG/DL (ref 8–27)
BUN/CREATININE RATIO: 13 (ref 12–28)
CALCIUM SERPL-MCNC: 9.9 MG/DL (ref 8.7–10.3)
CHLORIDE SERPLBLD-SCNC: 97 MMOL/L (ref 96–106)
CHOLEST SERPL-MCNC: 242 MG/DL (ref 100–199)
CREAT SERPL-MCNC: 0.84 MG/DL (ref 0.57–1)
EGFR IF AFRICN AM: 87 ML/MIN/1.73
EGFR IF NONAFRICN AM: 76 ML/MIN/1.73
GLOBULIN, TOTAL: 3.1 G/DL (ref 1.5–4.5)
GLUCOSE SERPL-MCNC: 90 MG/DL (ref 65–99)
HBA1C MFR BLD: 5.6 % (ref 4.8–5.6)
HCV AB SERPL QL IA: <0.1 S/CO RATIO (ref 0–0.9)
HDLC SERPL-MCNC: 61 MG/DL
LDL/HDL RATIO: 2.3 RATIO UNITS (ref 0–3.2)
LDLC SERPL CALC-MCNC: 142 MG/DL (ref 0–99)
POTASSIUM SERPL-SCNC: 4.5 MMOL/L (ref 3.5–5.2)
PROT SERPL-MCNC: 8 G/DL (ref 6–8.5)
SODIUM SERPL-SCNC: 141 MMOL/L (ref 134–144)
T4 FREE SERPL-MCNC: 1.22 NG/DL (ref 0.82–1.77)
TOTAL CO2: 25 MMOL/L (ref 18–28)
TRIGL SERPL-MCNC: 194 MG/DL (ref 0–149)
TSH BLD-ACNC: 6.37 UIU/ML (ref 0.45–4.5)
VITAMIN D, 25-HYDROXY: 22.3 NG/ML (ref 30–100)
VLDLC SERPL CALC-MCNC: 39 MG/DL (ref 5–40)

## 2018-01-05 LAB — SUMMARY OF DRUGS IDENTIFIED: NORMAL

## 2018-01-13 DIAGNOSIS — M79.605 PAIN OF LEFT LOWER EXTREMITY: ICD-10-CM

## 2018-01-15 DIAGNOSIS — M79.605 PAIN OF LEFT LOWER EXTREMITY: ICD-10-CM

## 2018-02-20 DIAGNOSIS — N89.8 VAGINAL DRYNESS: ICD-10-CM

## 2018-02-20 DIAGNOSIS — Z76.0 ENCOUNTER FOR MEDICATION REFILL: ICD-10-CM

## 2018-02-20 RX ORDER — ESTRADIOL 0.04 MG/D
PATCH TRANSDERMAL
Qty: 12 PATCH | Refills: 3 | Status: SHIPPED | OUTPATIENT
Start: 2018-02-20 | End: 2019-02-01

## 2018-02-26 DIAGNOSIS — I10 BENIGN ESSENTIAL HYPERTENSION: ICD-10-CM

## 2018-02-26 DIAGNOSIS — E78.00 ELEVATED CHOLESTEROL: ICD-10-CM

## 2018-02-26 DIAGNOSIS — E03.9 ACQUIRED HYPOTHYROIDISM: ICD-10-CM

## 2018-02-26 DIAGNOSIS — E78.00 PURE HYPERCHOLESTEROLEMIA: ICD-10-CM

## 2018-02-27 RX ORDER — SIMVASTATIN 20 MG
20 TABLET ORAL DAILY
Qty: 90 TABLET | Refills: 3 | Status: SHIPPED | OUTPATIENT
Start: 2018-02-27 | End: 2019-05-08

## 2018-02-27 RX ORDER — LEVOTHYROXINE SODIUM 25 UG/1
TABLET ORAL
Qty: 90 TABLET | Refills: 3 | Status: SHIPPED | OUTPATIENT
Start: 2018-02-27 | End: 2019-01-03

## 2018-02-27 RX ORDER — AMLODIPINE BESYLATE 5 MG/1
TABLET ORAL
Qty: 90 TABLET | Refills: 3 | Status: SHIPPED | OUTPATIENT
Start: 2018-02-27 | End: 2019-04-29

## 2018-05-14 DIAGNOSIS — M79.605 PAIN OF LEFT LOWER EXTREMITY: ICD-10-CM

## 2018-05-15 DIAGNOSIS — M79.605 PAIN OF LEFT LOWER EXTREMITY: ICD-10-CM

## 2018-09-26 DIAGNOSIS — Z23 NEED FOR PROPHYLACTIC VACCINATION AND INOCULATION AGAINST INFLUENZA: Primary | ICD-10-CM

## 2018-09-26 PROCEDURE — 90686 IIV4 VACC NO PRSV 0.5 ML IM: CPT | Performed by: FAMILY MEDICINE

## 2018-09-26 PROCEDURE — 90471 IMMUNIZATION ADMIN: CPT | Performed by: FAMILY MEDICINE

## 2018-09-26 NOTE — PROGRESS NOTES

## 2018-11-19 ENCOUNTER — HOSPITAL ENCOUNTER (OUTPATIENT)
Dept: MAMMOGRAPHY | Facility: CLINIC | Age: 63
Discharge: HOME | End: 2018-11-19
Attending: FAMILY MEDICINE | Admitting: FAMILY MEDICINE
Payer: COMMERCIAL

## 2018-11-19 DIAGNOSIS — Z12.31 VISIT FOR SCREENING MAMMOGRAM: ICD-10-CM

## 2018-11-19 PROCEDURE — 77067 SCR MAMMO BI INCL CAD: CPT

## 2018-12-04 ENCOUNTER — TELEPHONE (OUTPATIENT)
Dept: FAMILY MEDICINE | Facility: CLINIC | Age: 63
End: 2018-12-04

## 2018-12-04 NOTE — TELEPHONE ENCOUNTER
December 4, 2018   Received [facility] in clinic today [facility] - needs diagnostic mammo and ultrasound. They have mailed two notices to patient's home and not gotten response from patient.   Plan: I called patient today and left message on cell home number and cell number asking her to call nurse in our clinic.

## 2018-12-04 NOTE — TELEPHONE ENCOUNTER
December 4, 2018   1:05 PM  Patient called back and states has spoken with  [facility] already and told them that she does not plan to return for another mammogram until due again next Fall. Explained in detail the findings on 11/19 screening mammogram and need for additional mammogram views and possibly ultrasound. Patient verbalizes understands but states had similar scenario with left breast 2 years ago and was all negative. Discussed this current finding could be a breast cancer and waiting one year to address this is not advised. Patient again verbalizes understanding of this but does not plan do as MD's have advised. Encouraged her to reconsider and call [facility] to schedule or RTC to discuss with Dr. Johnson.  Lolly Busch RN

## 2018-12-31 ENCOUNTER — OFFICE VISIT (OUTPATIENT)
Dept: FAMILY MEDICINE | Facility: CLINIC | Age: 63
End: 2018-12-31

## 2018-12-31 VITALS
SYSTOLIC BLOOD PRESSURE: 130 MMHG | HEIGHT: 63 IN | HEART RATE: 75 BPM | WEIGHT: 191 LBS | RESPIRATION RATE: 16 BRPM | BODY MASS INDEX: 33.84 KG/M2 | OXYGEN SATURATION: 96 % | DIASTOLIC BLOOD PRESSURE: 82 MMHG

## 2018-12-31 DIAGNOSIS — R07.89 ATYPICAL CHEST PAIN: ICD-10-CM

## 2018-12-31 DIAGNOSIS — G89.4 CHRONIC PAIN SYNDROME: ICD-10-CM

## 2018-12-31 DIAGNOSIS — Z90.710 H/O: HYSTERECTOMY: ICD-10-CM

## 2018-12-31 DIAGNOSIS — I10 BENIGN ESSENTIAL HYPERTENSION: ICD-10-CM

## 2018-12-31 DIAGNOSIS — E03.9 ACQUIRED HYPOTHYROIDISM: ICD-10-CM

## 2018-12-31 DIAGNOSIS — M79.605 PAIN OF LEFT LOWER EXTREMITY: ICD-10-CM

## 2018-12-31 DIAGNOSIS — Z00.00 ROUTINE HISTORY AND PHYSICAL EXAMINATION OF ADULT: Primary | ICD-10-CM

## 2018-12-31 DIAGNOSIS — E55.9 VITAMIN D DEFICIENCY: ICD-10-CM

## 2018-12-31 DIAGNOSIS — E78.00 HYPERCHOLESTEREMIA: ICD-10-CM

## 2018-12-31 DIAGNOSIS — F41.9 ANXIETY: ICD-10-CM

## 2018-12-31 DIAGNOSIS — L98.9 CRACKING SKIN: ICD-10-CM

## 2018-12-31 DIAGNOSIS — D50.9 IRON DEFICIENCY ANEMIA, UNSPECIFIED IRON DEFICIENCY ANEMIA TYPE: ICD-10-CM

## 2018-12-31 DIAGNOSIS — R30.0 DYSURIA: ICD-10-CM

## 2018-12-31 DIAGNOSIS — R92.8 ABNORMAL MAMMOGRAM: ICD-10-CM

## 2018-12-31 DIAGNOSIS — Z76.0 ENCOUNTER FOR MEDICATION REFILL: ICD-10-CM

## 2018-12-31 DIAGNOSIS — E78.00 PURE HYPERCHOLESTEROLEMIA: ICD-10-CM

## 2018-12-31 DIAGNOSIS — N89.8 VAGINAL DRYNESS: ICD-10-CM

## 2018-12-31 DIAGNOSIS — R73.09 ELEVATED HEMOGLOBIN A1C: ICD-10-CM

## 2018-12-31 LAB
% GRANULOCYTES: 56.7 % (ref 42.2–75.2)
BACTERIA URINE: NORMAL
BILIRUB UR QL STRIP: 0
BLOOD URINE DIP: 0
CASTS/LPF: NORMAL
COLOR UR: YELLOW
CRYSTAL URINE: NORMAL
EPITHELIAL CELLS - QUEST: NORMAL
GLUCOSE UR STRIP-MCNC: 0 MG/DL
HCT VFR BLD AUTO: 38.8 % (ref 35–46)
HEMOGLOBIN: 12.8 G/DL (ref 11.8–15.5)
KETONES UR QL STRIP: 0
LEUKOCYTE ESTERASE URINE DIP: 0
LYMPHOCYTES NFR BLD AUTO: 35.2 % (ref 20.5–51.1)
MCH RBC QN AUTO: 28.2 PG (ref 27–31)
MCHC RBC AUTO-ENTMCNC: 32.9 G/DL (ref 33–37)
MCV RBC AUTO: 85.6 FL (ref 80–100)
MONOCYTES NFR BLD AUTO: 8.1 % (ref 1.7–9.3)
MUCOUS URINE: NORMAL
NITRITE UR QL STRIP: NORMAL
PH UR STRIP: 6.5 PH (ref 5–9)
PLATELET # BLD AUTO: 347 K/UL (ref 140–450)
PROT UR QL: 0 MG/DL (ref ?–0.01)
RBC # BLD AUTO: 4.53 X10/CMM (ref 3.7–5.2)
RBC URINE: NORMAL (ref 0–3)
SP GR UR STRIP: 1.01 (ref 1–1.02)
UROBILINOGEN UR QL STRIP: 0.2 EU/DL (ref 0.2–1)
WBC # BLD AUTO: 6.9 X10/CMM (ref 3.8–11)
WBC URINE: NORMAL (ref 0–3)

## 2018-12-31 PROCEDURE — 80061 LIPID PANEL: CPT | Mod: 90 | Performed by: FAMILY MEDICINE

## 2018-12-31 PROCEDURE — 81003 URINALYSIS AUTO W/O SCOPE: CPT | Performed by: FAMILY MEDICINE

## 2018-12-31 PROCEDURE — 99213 OFFICE O/P EST LOW 20 MIN: CPT | Mod: 25 | Performed by: FAMILY MEDICINE

## 2018-12-31 PROCEDURE — 80050 GENERAL HEALTH PANEL: CPT | Mod: 90 | Performed by: FAMILY MEDICINE

## 2018-12-31 PROCEDURE — 36415 COLL VENOUS BLD VENIPUNCTURE: CPT | Performed by: FAMILY MEDICINE

## 2018-12-31 PROCEDURE — 99396 PREV VISIT EST AGE 40-64: CPT | Performed by: FAMILY MEDICINE

## 2018-12-31 PROCEDURE — 82306 VITAMIN D 25 HYDROXY: CPT | Mod: 90 | Performed by: FAMILY MEDICINE

## 2018-12-31 PROCEDURE — 71046 X-RAY EXAM CHEST 2 VIEWS: CPT | Performed by: FAMILY MEDICINE

## 2018-12-31 PROCEDURE — 83036 HEMOGLOBIN GLYCOSYLATED A1C: CPT | Mod: 90 | Performed by: FAMILY MEDICINE

## 2018-12-31 PROCEDURE — 84439 ASSAY OF FREE THYROXINE: CPT | Mod: 90 | Performed by: FAMILY MEDICINE

## 2018-12-31 RX ORDER — TRAMADOL HYDROCHLORIDE 50 MG/1
TABLET ORAL
Qty: 90 TABLET | Refills: 3 | Status: SHIPPED | OUTPATIENT
Start: 2018-12-31 | End: 2019-09-11

## 2018-12-31 RX ORDER — VIT C/B6/B5/MAGNESIUM/HERB 173 50-5-6-5MG
500 CAPSULE ORAL
COMMUNITY

## 2018-12-31 ASSESSMENT — MIFFLIN-ST. JEOR: SCORE: 1400.5

## 2018-12-31 NOTE — PATIENT INSTRUCTIONS
Preventive Health Recommendations  Female Ages 50 - 64    Yearly exam: See your health care provider every year in order to  o Review health changes.   o Discuss preventive care.    o Review your medicines if your doctor has prescribed any.      Get a Pap test every three years (unless you have an abnormal result and your provider advises testing more often).    If you get Pap tests with HPV test, you only need to test every 5 years, unless you have an abnormal result.     You do not need a Pap test if your uterus was removed (hysterectomy) and you have not had cancer.    You should be tested each year for STDs (sexually transmitted diseases) if you're at risk.     Have a mammogram every 1 to 2 years.    Have a colonoscopy at age 50, or have a yearly FIT test (stool test). These exams screen for colon cancer.      Have a cholesterol test every 5 years, or more often if advised.    Have a diabetes test (fasting glucose) every three years. If you are at risk for diabetes, you should have this test more often.     If you are at risk for osteoporosis (brittle bone disease), think about having a bone density scan (DEXA).    Shots: Get a flu shot each year. Get a tetanus shot every 10 years.    Nutrition:     Eat at least 5 servings of fruits and vegetables each day.    Eat whole-grain bread, whole-wheat pasta and brown rice instead of white grains and rice.    Get adequate Calcium and Vitamin D.     Lifestyle    Exercise at least 150 minutes a week (30 minutes a day, 5 days a week). This will help you control your weight and prevent disease.    Limit alcohol to one drink per day.    No smoking.     Wear sunscreen to prevent skin cancer.     See your dentist every six months for an exam and cleaning.    See your eye doctor every 1 to 2 years.    [facility]  [phone]  Consider mouth guard  With flare eat soft foods, ice    Contract and labs done today  Hand carry Tramadol script given to patient  Refill done for  muscle relaxant  CXR negative

## 2018-12-31 NOTE — LETTER
[facility]  12/31/18    Patient: Monica Bryson  YOB: 1955  Medical Record Number: [id number]  CSN: [id number]                                                                              Non-opioid Controlled Substance Agreement    I understand that my care provider has prescribed a controlled substance to help manage my condition(s). I am taking this medicine to help me function or work. I know this is strong medicine, and that it can cause serious side effects. Controlled substances can be sedating, addicting and may cause a dependency on the drug. They can affect my ability to drive or think, and cause depression. They need to be taken exactly as prescribed. Combining controlled substances with certain medicines or chemicals (such as cocaine, sedatives and tranquilizers, sleeping pills, meth) can be dangerous or even fatal. Also, if I stop controlled substances suddenly, I may have severe withdrawal symptoms.  If not helpful, I may be asked to stop them.    The risks, benefits, and side effects of these medicine(s) were explained to me. I agree that:    1. I will take part in other treatments as advised by my care team. This may be psychiatry or counseling, physical therapy, behavioral therapy, group treatment or a referral to a pain clinic. I will reduce or stop my medicine when my care team tells me to do so.  2. I will take my medicines as prescribed. I will not change the dose or schedule unless my care team tells me to. There will be no refills if I  run out early.   I may be contactedwithout warning and asked to complete a urine drug test or pill count at any time.   3. I will keep all my appointments, and understand this is part of the monitoring of controlled substances. My care team may require an office visit for EVERY controlled substance refill. If I miss appointments or don t follow instructions, my care team may stop my medicine.  4. I will not ask other providers  to prescribe controlled substances, and I will not accept controlled substances from other people. If I need another prescribed controlled substance for a new reason, I will tell my care team within 1 business day.  5. I will use one pharmacy to fill all of my controlled substance prescriptions, and it is up to me to make sure that I do not run out of my medicines on weekends or holidays. If my care team is willing to refill my controlled substance prescription without a visit, I must request refills only during office hours, refills may take up to 3 days to process, and it may take up to 5 to 7 days for my medicine to be mailed and ready at my pharmacy. Prescriptions will not be mailed anywhere except my pharmacy.    6. I am responsible for my prescriptions. If the medicine/prescription is lost or stolen, it will not be replaced. I also agree not to share controlled substance medicines with anyone.              [facility]  12/31/18  Patient:  Monica Bryson  YOB: 1955  Medical Record Number: [id number]  [facility]: [id number]    7. I agree to not use ANY illegal or recreational drugs. This includes marijuana, cocaine, bath salts or other drugs. I agree not to use alcohol unless my care team says I may. I agree to give urine samples whenever asked. If I don t give a urine sample, the care team may stop my medicine.    8. If I enroll in the [place] Medical Marijuana program, I will tell my care team. I will also sign an agreement to share my medical records with my care team.    9. I will bring in my list of medicines (or my medicine bottles) each time I come to the clinic.   10. I will tell my care team right away if I become pregnant or have a new medical problem treated outside of my regular clinic.  11. I understand that this medicine can affect my thinking and judgment. It may be unsafe for me to drive, use machinery and do dangerous tasks. I will not do any of these things until I  know how the medicine affects me. If my dose changes, I will wait to see how it affects me. I will contact my care team if I have concerns about medicine side effects.    I understand that if I do not follow any of the conditions above, my prescriptions or treatment may be stopped.      I agree that my provider, clinic care team, and pharmacy may work with any city, state or federal law enforcement agency that investigates the misuse, sale, or other diversion of my controlled medicine. I will allow my provider to discuss my care with or share a copy of this agreement with any other treating provider, pharmacy or emergency room where I receive care. I agree to give up (waive) any right of privacy or confidentiality with respect to these consents.   I have read this agreement and have asked questions about anything I did not understand.    ____________________________________________________    ____________  ________  Patient signature                                                         Date      Time    ____________________________________________________     ____________  ________  Witness                                                          Date      Time    ____________________________________________________  Provider signature

## 2018-12-31 NOTE — PROGRESS NOTES
"Physical  DID F/U MAMMOGRAM STUDIES GET DONE? NO SHE CHOSE NOT TO DO THIS  Colonoscopy 2013 DUE FOR F/U Polyp - she will schedule this   SUBJECTIVE:   CC: Monica Bryson is an 63 year old woman who presents for preventive health visit.     No travel plans    New contract and toxscreen for tramadol    Lower back pain low pain today.  Couple weeks ago and up in the morning bending then pain rib cage and diaphragm.Sharp hurts with breath.A little now.  Left knee pain - Was referred to PT but got muscle relaxer which helped so never went to PT, bad when going up/ down steps, standing up from sitting position  Jaw pain - left side when chewing, can hear it like its not in place. Happens both side. No TMJ dentist or mouth guard. Does not eat meat. Given dental number  Tylenol ASA sometimes    Heel cracked and dry    Wants labs  Flu shot UTD    May Eye turned red and held the ASA then.   Eye exam saw the eye doctor then.   Dry eye using drops  Has glasses    Wt Readings from Last 4 Encounters:   12/31/18 86.6 kg (191 lb)   12/29/17 81.6 kg (180 lb)   12/28/16 80.3 kg (177 lb)   12/22/15 76.8 kg (169 lb 6.4 oz)   Estimated body mass index is 33.83 kg/m  as calculated from the following:    Height as of this encounter: 1.6 m (5' 3\").    Weight as of this encounter: 86.6 kg (191 lb).  Weight loss is recommended  She declined nutritionist    Breast enlargement. ? Back pain   Does not want surgery for this        Sleep awakens , 5 hours, Nap midmorning 2 hours. Nocturia 3 times  Appetite ok  Fasting today  Supper last night rice with spinach and tofu. Water and tea herbal  Exercise swimming and walking in the water or outside when weather permits. Hot tub. Stretching    Smoking never  ETOH no  Street drugs/MJ no  Caffeine coffee 1-2 per week    Depression no  Anxiety no  Panic no  SI/SP no  Hallucinations no  Paranoid no  Manic no  OCD no  PTSD no  Gambling no    Work-no  Hobbies- hard to read, some TV      Healthy " "Habits:    Do you get at least three servings of calcium containing foods daily (dairy, green leafy vegetables, etc.)? yes    Amount of exercise or daily activities, outside of work: 2 day(s) per week    Problems taking medications regularly No    Medication side effects: No    Have you had an eye exam in the past two years? yes    Do you see a dentist twice per year? yes    Do you have sleep apnea, excessive snoring or daytime drowsiness?no    HEARING FREQUENCY:     Right Ear:     500 Hz : Pass   1000 Hz: Pass   2000 Hz: Pass   4000 Hz: Pass  Left Ear:     500 Hz :  Pass   1000 Hz: Pass   2000 Hz: Pass   4000 Hz: Pass    Yany Desai [facility]      Today's PHQ-2 Score:   PHQ-2 ( 1999 Pf[employer]) 12/28/2016 12/22/2015   Q1: Little interest or pleasure in doing things 1 1   Q2: Feeling down, depressed or hopeless 1 1   PHQ-2 Score 2 2       Abuse: Current or Past(Physical, Sexual or Emotional)- No  Do you feel safe in your environment? Yes    Social History     Tobacco Use     Smoking status: Never Smoker     Smokeless tobacco: Never Used   Substance Use Topics     Alcohol use: Yes     Alcohol/week: 0.0 oz     Comment: not at all     If you drink alcohol do you typically have >3 drinks per day or >7 drinks per week? Not Applicable                     Reviewed orders with patient.  Reviewed health maintenance and updated orders accordingly - Yes  Labs reviewed in EPIC  BP Readings from Last 3 Encounters:   12/31/18 148/90   12/29/17 118/78   11/07/17 102/58    Wt Readings from Last 3 Encounters:   12/31/18 86.6 kg (191 lb)   12/29/17 81.6 kg (180 lb)   12/28/16 80.3 kg (177 lb)       Estimated body mass index is 33.83 kg/m  as calculated from the following:    Height as of this encounter: 1.6 m (5' 3\").    Weight as of this encounter: 86.6 kg (191 lb).             Patient Active Problem List   Diagnosis     Hepatitis B infection     Anxiety     H/O: hysterectomy     Dyslipidemia     Health Care Home     Advanced directives, " "counseling/discussion     Hypothyroidism     Benign essential hypertension     Hypercholesteremia     Past Surgical History:   Procedure Laterality Date     TABSO  2-9-2007       Social History     Tobacco Use     Smoking status: Never Smoker     Smokeless tobacco: Never Used   Substance Use Topics     Alcohol use: Yes     Alcohol/week: 0.0 oz     Comment: not at all     No family history on file.      Current Outpatient Medications   Medication Sig Dispense Refill     acetaminophen (TYLENOL) 500 MG tablet Take 1-2 tablets by mouth every 6 hours as needed.       amLODIPine (NORVASC) 5 MG tablet TAKE 1 TABLET(5 MG) BY MOUTH DAILY 90 tablet 3     ascorbic acid (VITAMIN C) 500 MG tablet Take 1,000 mg by mouth three times a week       ASPIRIN PO Take 81 mg by mouth daily       Cholecalciferol (VITAMIN D) 2000 UNITS tablet Take two tabs for total of 4000 iu daily 100 tablet 3     Coenzyme Q10 (CO Q 10) 100 MG CAPS Take 100 mg by mouth daily 90 capsule 3     COMPOUNDED NON-CONTROLLED SUBSTANCE (CMPD RX) - PHARMACY TO MIX COMPOUNDED MEDICATION Estradiol 0.02% cream HRT base 30 g 3     Cyanocobalamin (VITAMIN B 12 PO) Take 1 tablet by mouth every other day       estradiol (ESTRACE VAGINAL) 0.1 MG/GM cream Place 2 g vaginally twice a week 42.5 g 10     estradiol 0.0375 MG/24HR PTWK UNWRAP AND APPLY 1 PATCH TO SKIN EVERY WEEK 12 patch 3     Flaxseed, Linseed, (FLAX SEED OIL) 1000 MG capsule Take 1 capsule (1,000 mg) by mouth daily 100 capsule 1     glucosamine-chondroitin 500-400 MG CAPS Take 1 capsule by mouth daily       levothyroxine (SYNTHROID/LEVOTHROID) 25 MCG tablet Take 1 tablet by mouth daily 90 tablet 3     NEW MED Take 1 tablet by mouth twice a week Pt taking \"beet\" tablet and \"artichoke\" tablet twice weekly       predniSONE (DELTASONE) 20 MG tablet Take 1 tablet (20 mg) by mouth daily 5 tablet 0     simvastatin (ZOCOR) 20 MG tablet Take 1 tablet (20 mg) by mouth daily 90 tablet 3     tiZANidine (ZANAFLEX) 4 MG " tablet TAKE 1 TO 2 TABLETS(4 TO 8 MG) BY MOUTH THREE TIMES DAILY AS NEEDED FOR MUSCLE SPASMS 30 tablet 0     tiZANidine (ZANAFLEX) 4 MG tablet TAKE 1 TO 2 TABLETS(4 TO 8 MG) BY MOUTH THREE TIMES DAILY AS NEEDED FOR MUSCLE SPASMS 30 tablet 0     traMADol (ULTRAM) 50 MG tablet TAKE 1/2-1 TABLET BY MOUTH FOUR TIMES DAILY AS NEEDED 30 tablet 1     TraMADol HCl 50 MG TBDP Take 25 mg by mouth 4 times daily as needed 30 tablet 0     vitamin B complex with vitamin C (VITAMIN  B COMPLEX) TABS tablet Take 1 tablet by mouth every other day       No Known Allergies  Recent Labs   Lab Test 17  0900 17  0859 16  0919 12/22/15  0840  12  0825 01/15/12  1205   A1C 5.6  --  5.8*  --   --   --   --    LDL  --  142* 123* 120*   < >  --   --    HDL  --  61 62 56   < >  --   --    TRIG  --  194* 208* 158*   < >  --   --    ALT  --  25 13 18   < > 29 7   CR  --  0.84 0.66 0.58   < > 0.70 0.57   GFRESTIMATED  --   --   --   --   --  87 >90   GFRESTBLACK  --   --   --   --   --  >90 >90   POTASSIUM  --  4.5 4.0 4.0   < > 3.4 3.5   TSH  --   --   --   --   --  3.57 1.13    < > = values in this interval not displayed.          Pertinent mammograms are reviewed under the imaging tab.  History of abnormal Pap smear: Status post benign hysterectomy. Health Maintenance and Surgical History updated.     Reviewed and updated as needed this visit by clinical staff         Reviewed and updated as needed this visit by Provider        Past Medical History:   Diagnosis Date     Anxiety      Dyslipidemia      H/O: hysterectomy      Hepatitis B      HTN (hypertension)      Hypothyroidism      Osteoarthritis cervical spine     knees      Past Surgical History:   Procedure Laterality Date     TABSO  2007     Obstetric History       T0      L4     SAB0   TAB0   Ectopic0   Multiple0   Live Births0       # Outcome Date GA Lbr Syed/2nd Weight Sex Delivery Anes PTL Lv   5 AB            4 Para            3 Para           "  2 Para            1 Para                   ROS:  CONSTITUTIONAL: NEGATIVE for fever, chills, change in weight  INTEGUMENTARY/SKIN: NEGATIVE for worrisome rashes, moles or lesions  EYES: NEGATIVE for vision changes or irritation  ENT: NEGATIVE for ear, mouth and throat problems  RESP: NEGATIVE for significant cough or SOB  BREAST: NEGATIVE for masses, tenderness or discharge  CV: NEGATIVE for chest pain, palpitations or peripheral edema  GI: NEGATIVE for nausea, abdominal pain, heartburn, or change in bowel habits  : NEGATIVE for unusual urinary or vaginal symptoms. No vaginal bleeding.  MUSCULOSKELETAL: NEGATIVE for significant arthralgias or myalgia  NEURO: NEGATIVE for weakness, dizziness or paresthesias  ENDOCRINE: NEGATIVE for temperature intolerance, skin/hair changes  HEME/ALLERGY/IMMUNE: NEGATIVE for bleeding problems  PSYCHIATRIC: NEGATIVE for changes in mood or affect     OBJECTIVE:   /82   Pulse 75   Resp 16   Ht 1.6 m (5' 3\")   Wt 86.6 kg (191 lb)   SpO2 96%   BMI 33.83 kg/m    EXAM:  GENERAL APPEARANCE: healthy, alert and no distress  EYES: Eyes grossly normal to inspection, PERRL and conjunctivae and sclerae normal  HENT: ear canals and TM's normal, nose and mouth without ulcers or lesions, oropharynx clear and oral mucous membranes moist  NECK: no adenopathy, no asymmetry, masses, or scars and thyroid normal to palpation  RESP: lungs clear to auscultation - no rales, rhonchi or wheezes  BREAST: normal without masses, tenderness or nipple discharge and no palpable axillary masses or adenopathy  CV: regular rate and rhythm, normal S1 S2, no S3 or S4, no murmur, click or rub, no peripheral edema and peripheral pulses strong  ABDOMEN: soft, nontender, no hepatosplenomegaly, no masses and bowel sounds normal  MS: no musculoskeletal defects are noted and gait is age appropriate without ataxia  SKIN: no suspicious lesions or rashes  NEURO: Normal strength and tone, sensory exam grossly normal, " mentation intact and speech normal  PSYCH: mentation appears normal and affect normal/bright    Diagnostic Test Results:  Results for orders placed or performed during the hospital encounter of 11/19/18   MA Screening Digital Bilateral    Narrative    SCREENING MAMMOGRAM, BILATERAL, DIGITAL w/CAD - 11/19/2018 1:27 PM    BREAST SYMPTOMS: No current breast complaints.     COMPARISON:  11/13/2017, 10/31/2016, 5/6/2015, 4/9/2014.    BREAST DENSITY: Heterogeneously dense.    COMMENTS: There is a focal asymmetry in the central right breast along  the plane of the nipple in the CC view. This should be further  evaluated with diagnostic imaging.      Impression    IMPRESSION: BI-RADS CATEGORY: 0 - Incomplete - Need Additional Imaging  Evaluation and/or Prior Mammograms for Comparison    RECOMMENDED FOLLOW-UP: Diagnostic Mammogram and Ultrasound.    Exam results letter mailed to patient.      THEODORE VELA MD       ASSESSMENT/PLAN:       ICD-10-CM    1. Routine history and physical examination of adult Z00.00    2. Acquired hypothyroidism E03.9 TSH ([facility])     Thyroxine (T4) Free  Direct  S ([facility])   3. Hypercholesteremia E78.00    4. Anxiety F41.9    5. Pure hypercholesterolemia E78.00 Lipid Panel ([facility])   6. BMI 33.0-33.9,adult Z68.33 Comp. Metabolic Panel (14) ([facility])   7. Vitamin D deficiency E55.9 Vitamin D  25-Hydroxy ([facility])   8. Iron deficiency anemia, unspecified iron deficiency anemia type D50.9 CBC with Diff/Plt ([facility])   9. Dysuria R30.0 Urinalysis w/reflex protein, bili ([facility])     Urine Culture  Routine ([facility])   10. Abnormal mammogram R92.8    11. Benign essential hypertension I10 Comp. Metabolic Panel (14) ([facility])   12. Vaginal dryness N89.8    13. Chronic pain syndrome G89.4 CANCELED: ToxASSURE Urine Drug Screen ([facility])   14. Elevated hemoglobin A1c R73.09 Hemoglobin A1C ([facility])   15. Cracking skin L98.9    16. Atypical chest pain R07.89 X-ray Chest 2 vws*   17. Pain of left lower extremity  "M79.605 tiZANidine (ZANAFLEX) 4 MG tablet   18. Encounter for medication refill Z76.0 traMADol (ULTRAM) 50 MG tablet   19. H/O: hysterectomy Z90.710        COUNSELING:   Reviewed preventive health counseling, as reflected in patient instructions       Regular exercise       Healthy diet/nutrition       Vision screening       Immunizations    Reviewed and advised             Osteoporosis Prevention/Bone Health       Colon cancer screening    BP Readings from Last 1 Encounters:   12/31/18 130/82     Estimated body mass index is 33.83 kg/m  as calculated from the following:    Height as of this encounter: 1.6 m (5' 3\").    Weight as of this encounter: 86.6 kg (191 lb).      Weight management plan: Discussed healthy diet and exercise guidelines     reports that  has never smoked. she has never used smokeless tobacco.      Counseling Resources:  ATP IV Guidelines  Pooled Cohorts Equation Calculator  Breast Cancer Risk Calculator  FRAX Risk Assessment  ICSI Preventive Guidelines  Dietary Guidelines for Americans, 2010  USDA's MyPlate  ASA Prophylaxis  Lung CA Screening    Angeles Johnson MD  [facility]  "

## 2019-01-03 ENCOUNTER — TELEPHONE (OUTPATIENT)
Dept: FAMILY MEDICINE | Facility: CLINIC | Age: 64
End: 2019-01-03

## 2019-01-03 DIAGNOSIS — E03.9 ACQUIRED HYPOTHYROIDISM: ICD-10-CM

## 2019-01-03 RX ORDER — LEVOTHYROXINE SODIUM 50 UG/1
TABLET ORAL
Qty: 90 TABLET | Refills: 0 | Status: SHIPPED | OUTPATIENT
Start: 2019-01-03 | End: 2019-04-05

## 2019-01-03 NOTE — TELEPHONE ENCOUNTER
----- Message from Angeles Johnson MD sent at 1/2/2019  9:27 AM CST -----  Please send a letter with results.   Urine culture had no growth  CMP with minor change no action needed  Lipids total, triglycerides, LDL are elevated. LDL has improved.Eat more fish, fish oil, ground flax seed and oatmeal. Eat less sugars.  Vitamin D is low. Take an over the counter supplement [id number] international unit(s) per day.  TSH is high, Free T4 is normal.levothyroxine (SYNTHROID/LEVOTHROID) 25 MCG tablet is your current dose. We could consider increasing that to 50 mcg. Let me know if you want to do that. Then we would recheck the labs in another 6-8 weeks after that dose increase.  A1C was normal.  Urine was normal.  CBC with minor change. No action needed.    Give patient the results and find out if she wants to try increasing the thyroid medication dose.             Angeles Johnson MD

## 2019-01-03 NOTE — TELEPHONE ENCOUNTER
Called to discuss results per Dr Johnson. Patient agreed to increase levothyroxine to 50mcg-new prescription for increased dose sent to pharmacy and future lab entered. Patient will increase vitamin D to 2000-3000IU daily. Bernadine Montaño

## 2019-01-08 ENCOUNTER — TELEPHONE (OUTPATIENT)
Dept: FAMILY MEDICINE | Facility: CLINIC | Age: 64
End: 2019-01-08

## 2019-01-08 NOTE — TELEPHONE ENCOUNTER
Received fax from [facility] that patient's rx for Tramadol needs PA.  Submitted through [employer].  Was approved right away with approval dates of   12/19/2018-01/08/2020.  Called [facility] to inform of approval.

## 2019-01-28 DIAGNOSIS — Z76.0 ENCOUNTER FOR MEDICATION REFILL: ICD-10-CM

## 2019-02-01 RX ORDER — ESTRADIOL 0.04 MG/D
PATCH TRANSDERMAL
Qty: 12 PATCH | Refills: 3 | Status: SHIPPED | OUTPATIENT
Start: 2019-02-01 | End: 2020-02-20

## 2019-02-27 DIAGNOSIS — M79.605 PAIN OF LEFT LOWER EXTREMITY: ICD-10-CM

## 2019-04-05 DIAGNOSIS — E03.9 ACQUIRED HYPOTHYROIDISM: ICD-10-CM

## 2019-04-05 RX ORDER — LEVOTHYROXINE SODIUM 50 UG/1
TABLET ORAL
Qty: 90 TABLET | Refills: 0 | Status: SHIPPED | OUTPATIENT
Start: 2019-04-05 | End: 2019-04-28

## 2019-04-05 NOTE — TELEPHONE ENCOUNTER
LEVOTHYROXINE  Last OV & Labs 12/31/18    TSH   Date Value Ref Range Status   01/16/2012 3.57 0.4 - 5.0 mU/L Final

## 2019-04-28 DIAGNOSIS — E03.9 ACQUIRED HYPOTHYROIDISM: ICD-10-CM

## 2019-04-29 DIAGNOSIS — I10 BENIGN ESSENTIAL HYPERTENSION: ICD-10-CM

## 2019-04-29 RX ORDER — LEVOTHYROXINE SODIUM 50 UG/1
TABLET ORAL
Qty: 90 TABLET | Refills: 0 | Status: SHIPPED | OUTPATIENT
Start: 2019-04-29 | End: 2019-08-29

## 2019-04-29 RX ORDER — AMLODIPINE BESYLATE 5 MG/1
TABLET ORAL
Qty: 90 TABLET | Refills: 3 | Status: SHIPPED | OUTPATIENT
Start: 2019-04-29 | End: 2020-05-14

## 2019-04-29 NOTE — TELEPHONE ENCOUNTER
TSH   Date Value Ref Range Status   01/16/2012 3.57 0.4 - 5.0 mU/L Final     T4 Free   Date Value Ref Range Status   12/31/2018 1.07 0.82 - 1.77 ng/dL Final

## 2019-05-08 DIAGNOSIS — E78.00 ELEVATED CHOLESTEROL: ICD-10-CM

## 2019-05-08 DIAGNOSIS — E78.00 PURE HYPERCHOLESTEROLEMIA: ICD-10-CM

## 2019-05-08 RX ORDER — SIMVASTATIN 20 MG
20 TABLET ORAL DAILY
Qty: 90 TABLET | Refills: 3 | Status: SHIPPED | OUTPATIENT
Start: 2019-05-08 | End: 2020-05-14

## 2019-05-08 NOTE — TELEPHONE ENCOUNTER
Simvastatin  LOV & Labs 12/31/18    Lab Results   Component Value Date    CHOL 203 12/31/2018    CHOL 242 12/29/2017     Lab Results   Component Value Date    HDL 51 12/31/2018    HDL 61 12/29/2017     Lab Results   Component Value Date     12/31/2018     12/29/2017     Lab Results   Component Value Date    TRIG 237 12/31/2018    TRIG 194 12/29/2017

## 2019-08-29 DIAGNOSIS — E03.9 ACQUIRED HYPOTHYROIDISM: ICD-10-CM

## 2019-08-29 RX ORDER — LEVOTHYROXINE SODIUM 50 UG/1
TABLET ORAL
Qty: 90 TABLET | Refills: 0 | Status: SHIPPED | OUTPATIENT
Start: 2019-08-29 | End: 2020-04-22

## 2019-09-03 ENCOUNTER — OFFICE VISIT (OUTPATIENT)
Dept: FAMILY MEDICINE | Facility: CLINIC | Age: 64
End: 2019-09-03

## 2019-09-03 VITALS
DIASTOLIC BLOOD PRESSURE: 98 MMHG | RESPIRATION RATE: 16 BRPM | HEART RATE: 76 BPM | OXYGEN SATURATION: 98 % | WEIGHT: 187.38 LBS | SYSTOLIC BLOOD PRESSURE: 160 MMHG | BODY MASS INDEX: 33.19 KG/M2
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
## 2019-09-03 DIAGNOSIS — S80.862A INSECT BITE OF LEFT LOWER LEG, INITIAL ENCOUNTER: Primary | ICD-10-CM

## 2019-09-03 DIAGNOSIS — Z23 NEED FOR VACCINATION: ICD-10-CM

## 2019-09-03 PROCEDURE — 90471 IMMUNIZATION ADMIN: CPT | Performed by: FAMILY MEDICINE

## 2019-09-03 PROCEDURE — 90714 TD VACC NO PRESV 7 YRS+ IM: CPT | Performed by: FAMILY MEDICINE

## 2019-09-03 PROCEDURE — 99213 OFFICE O/P EST LOW 20 MIN: CPT | Mod: 25 | Performed by: FAMILY MEDICINE

## 2019-09-03 RX ORDER — CEPHALEXIN 500 MG/1
500 CAPSULE ORAL 3 TIMES DAILY
Qty: 30 CAPSULE | Refills: 0 | Status: SHIPPED | OUTPATIENT
Start: 2019-09-03 | End: 2020-05-05

## 2019-09-03 NOTE — PROGRESS NOTES
SUBJECTIVE:   Chief Complaint   Patient presents with     Insect Bites     8/23/19 bee sting on left foot(toe). Progressive pain and stiffness from toe to hip. Seems to have gotten worse over last 2 weeks.      Monica Bryson is a 64 year old female who presents for evaluation of and area of redness, tenderness, swelling and warmth of the skin that developed on the  left, entire  Lower leg.  Patient has had fever, pain, red streaks and tenderness for 1 week.    Precipitating event was animal bite or sting.    Therapies tried: topical antibiotic.    Past Medical History:   Diagnosis Date     Anxiety      Dyslipidemia      H/O: hysterectomy      Hepatitis B      HTN (hypertension)      Hypothyroidism      Osteoarthritis cervical spine     knees       ALLERGIES:  Patient has no known allergies.      Current Outpatient Medications on File Prior to Visit:  acetaminophen (TYLENOL) 500 MG tablet Take 1-2 tablets by mouth every 6 hours as needed.   amLODIPine (NORVASC) 5 MG tablet TAKE 1 TABLET(5 MG) BY MOUTH DAILY   ascorbic acid (VITAMIN C) 500 MG tablet Take 1,000 mg by mouth three times a week   ASPIRIN PO Take 81 mg by mouth daily   Cholecalciferol (VITAMIN D) 2000 UNITS tablet Take two tabs for total of 4000 iu daily   Coenzyme Q10 (CO Q 10) 100 MG CAPS Take 100 mg by mouth daily   COMPOUNDED NON-CONTROLLED SUBSTANCE (CMPD RX) - PHARMACY TO MIX COMPOUNDED MEDICATION Estradiol 0.02% cream HRT base   Cyanocobalamin (VITAMIN B 12 PO) Take 1 tablet by mouth every other day   estradiol (CLIMARA) 0.0375 MG/24HR weekly patch UNWRAP AND APPLY 1 PATCH TO SKIN EVERY WEEK   estradiol (ESTRACE VAGINAL) 0.1 MG/GM cream Place 2 g vaginally twice a week   Flaxseed, Linseed, (FLAX SEED OIL) 1000 MG capsule Take 1 capsule (1,000 mg) by mouth daily   glucosamine-chondroitin 500-400 MG CAPS Take 1 capsule by mouth daily   levothyroxine (SYNTHROID/LEVOTHROID) 50 MCG tablet TAKE 1 TABLET BY MOUTH DAILY   NEW MED Take 1 tablet by  "mouth twice a week Pt taking \"beet\" tablet and \"artichoke\" tablet twice weekly   predniSONE (DELTASONE) 20 MG tablet Take 1 tablet (20 mg) by mouth daily   simvastatin (ZOCOR) 20 MG tablet Take 1 tablet (20 mg) by mouth daily   Soap & Cleansers (ALOEVERA EX)    tiZANidine (ZANAFLEX) 4 MG tablet TAKE 1 TO 2 TABLETS(4 TO 8 MG) BY MOUTH THREE TIMES DAILY AS NEEDED FOR MUSCLE SPASMS   Turmeric 500 MG CAPS Take 500 mg by mouth   vitamin B complex with vitamin C (VITAMIN  B COMPLEX) TABS tablet Take 1 tablet by mouth every other day   tiZANidine (ZANAFLEX) 4 MG tablet TAKE 1 TO 2 TABLETS(4 TO 8 MG) BY MOUTH THREE TIMES DAILY AS NEEDED FOR MUSCLE SPASMS (Patient not taking: Reported on 9/3/2019)   traMADol (ULTRAM) 50 MG tablet TAKE 1/2-1 TABLET BY MOUTH FOUR TIMES DAILY AS NEEDED (Patient not taking: Reported on 9/3/2019)   TraMADol HCl 50 MG TBDP Take 25 mg by mouth 4 times daily as needed (Patient not taking: Reported on 9/3/2019)     No current facility-administered medications on file prior to visit.     Social History     Tobacco Use     Smoking status: Never Smoker     Smokeless tobacco: Never Used   Substance Use Topics     Alcohol use: Yes     Alcohol/week: 0.0 oz     Comment: not at all     ROS:  INTEGUMENTARY/SKIN: NEGATIVE for worrisome rashes, moles or lesions  EYES: NEGATIVE for vision changes or irritation    OBJECTIVE:  BP (!) 160/98   Pulse 76   Resp 16   Wt 85 kg (187 lb 6 oz)   SpO2 98%   BMI 33.19 kg/m      Skin area involved is 10 cm by 10 cm on the left,  anterior and inferior lower L leg.   The skin appear erythematous, moderately swollen, with tenderness and warmth to the touch.  GENERAL APPEARANCE: mild distress and over weight  EYES: EOMI,  PERRL, conjunctiva clear  NECK: supple, non-tender to palpation, no adenopathy noted  RESP: lungs clear to auscultation - no rales, rhonchi or wheezes  CV: regular rates and rhythm, normal S1 S2, no murmur noted     ASSESSMENT:  1. Insect bite of left " lower leg, initial encounter    - TD PRSERV FREE >=7 YRS ADS IM [[id number]]  - 1st  Administration  [[id number]]    2. Need for vaccination    - TD PRSERV FREE >=7 YRS ADS IM [[id number]]  - 1st  Administration  [[id number]]     PLAN:  Antibiotics:. Cephalexin 500 mg tid x 10 days    Keep elevated   Symptomatic cares were discussed in detail.  Pt instructed to come back to the clinic for worsening sx

## 2019-09-11 ENCOUNTER — OFFICE VISIT (OUTPATIENT)
Dept: FAMILY MEDICINE | Facility: CLINIC | Age: 64
End: 2019-09-11

## 2019-09-11 VITALS
DIASTOLIC BLOOD PRESSURE: 88 MMHG | RESPIRATION RATE: 16 BRPM | BODY MASS INDEX: 33.3 KG/M2 | HEART RATE: 85 BPM | WEIGHT: 188 LBS | SYSTOLIC BLOOD PRESSURE: 132 MMHG | OXYGEN SATURATION: 96 %

## 2019-09-11 DIAGNOSIS — S90.861D INSECT BITE OF RIGHT FOOT, SUBSEQUENT ENCOUNTER: Primary | ICD-10-CM

## 2019-09-11 DIAGNOSIS — M79.672 PAIN OF LEFT HEEL: ICD-10-CM
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
## 2019-09-11 PROCEDURE — 99214 OFFICE O/P EST MOD 30 MIN: CPT | Mod: 25 | Performed by: FAMILY MEDICINE

## 2019-09-11 PROCEDURE — 73630 X-RAY EXAM OF FOOT: CPT | Mod: LT | Performed by: FAMILY MEDICINE

## 2019-09-11 RX ORDER — TRAMADOL HYDROCHLORIDE 50 MG/1
TABLET ORAL
Qty: 90 TABLET | Refills: 3 | Status: SHIPPED | OUTPATIENT
Start: 2019-09-11 | End: 2019-11-06

## 2019-09-11 RX ORDER — PREDNISONE 20 MG/1
20 TABLET ORAL DAILY
Qty: 7 TABLET | Refills: 0 | Status: SHIPPED | OUTPATIENT
Start: 2019-09-11 | End: 2020-05-05

## 2019-09-11 NOTE — PROGRESS NOTES
SUBJECTIVE:  Chief Complaint   Patient presents with     Musculoskeletal Problem     Continued pain after insect bite. Seems to be worse     Monica Bryson is a 64 year old female who presents with a chief complaint of left foot and heel pain and tenderness.  Symptoms began 2 week(s) ago, are moderate and worsening  Context:  Injury:Yes: had insect bite initially but no new injury.  Pain exacerbated by weight-bearing Relieved by rest and ice.  She treated it initially with abx for insect sting/infection. This is not the first time this type of injury has occurred to this patient.     Past Medical History:   Diagnosis Date     Anxiety      Dyslipidemia      H/O: hysterectomy      Hepatitis B      HTN (hypertension)      Hypothyroidism      Osteoarthritis cervical spine     knees     Current Outpatient Medications   Medication Sig Dispense Refill     predniSONE (DELTASONE) 20 MG tablet Take 1 tablet (20 mg) by mouth daily 7 tablet 0     acetaminophen (TYLENOL) 500 MG tablet Take 1-2 tablets by mouth every 6 hours as needed.       amLODIPine (NORVASC) 5 MG tablet TAKE 1 TABLET(5 MG) BY MOUTH DAILY 90 tablet 3     ascorbic acid (VITAMIN C) 500 MG tablet Take 1,000 mg by mouth three times a week       ASPIRIN PO Take 81 mg by mouth daily       cephALEXin (KEFLEX) 500 MG capsule Take 1 capsule (500 mg) by mouth 3 times daily 30 capsule 0     Cholecalciferol (VITAMIN D) 2000 UNITS tablet Take two tabs for total of 4000 iu daily 100 tablet 3     Coenzyme Q10 (CO Q 10) 100 MG CAPS Take 100 mg by mouth daily 90 capsule 3     COMPOUNDED NON-CONTROLLED SUBSTANCE (CMPD RX) - PHARMACY TO MIX COMPOUNDED MEDICATION Estradiol 0.02% cream HRT base 30 g 3     Cyanocobalamin (VITAMIN B 12 PO) Take 1 tablet by mouth every other day       estradiol (CLIMARA) 0.0375 MG/24HR weekly patch UNWRAP AND APPLY 1 PATCH TO SKIN EVERY WEEK 12 patch 3     estradiol (ESTRACE VAGINAL) 0.1 MG/GM cream Place 2 g vaginally twice a week 42.5 g 10  "    Flaxseed, Linseed, (FLAX SEED OIL) 1000 MG capsule Take 1 capsule (1,000 mg) by mouth daily 100 capsule 1     glucosamine-chondroitin 500-400 MG CAPS Take 1 capsule by mouth daily       levothyroxine (SYNTHROID/LEVOTHROID) 50 MCG tablet TAKE 1 TABLET BY MOUTH DAILY 90 tablet 0     NEW MED Take 1 tablet by mouth twice a week Pt taking \"beet\" tablet and \"artichoke\" tablet twice weekly       predniSONE (DELTASONE) 20 MG tablet Take 1 tablet (20 mg) by mouth daily 5 tablet 0     simvastatin (ZOCOR) 20 MG tablet Take 1 tablet (20 mg) by mouth daily 90 tablet 3     Soap & Cleansers (ALOEVERA EX)        tiZANidine (ZANAFLEX) 4 MG tablet TAKE 1 TO 2 TABLETS(4 TO 8 MG) BY MOUTH THREE TIMES DAILY AS NEEDED FOR MUSCLE SPASMS (Patient not taking: Reported on 9/3/2019) 90 tablet 0     tiZANidine (ZANAFLEX) 4 MG tablet TAKE 1 TO 2 TABLETS(4 TO 8 MG) BY MOUTH THREE TIMES DAILY AS NEEDED FOR MUSCLE SPASMS 30 tablet 0     traMADol (ULTRAM) 50 MG tablet TAKE 1/2-1 TABLET BY MOUTH FOUR TIMES DAILY AS NEEDED (Patient not taking: Reported on 9/3/2019) 90 tablet 3     Turmeric 500 MG CAPS Take 500 mg by mouth       vitamin B complex with vitamin C (VITAMIN  B COMPLEX) TABS tablet Take 1 tablet by mouth every other day       Social History     Tobacco Use     Smoking status: Never Smoker     Smokeless tobacco: Never Used   Substance Use Topics     Alcohol use: Yes     Alcohol/week: 0.0 oz     Comment: not at all     ROS:  RESP:NEGATIVE for significant cough or SOB  CV: NEGATIVE for chest pain, palpitations or peripheral edema    EXAM:   /88   Pulse 85   Resp 16   Wt 85.3 kg (188 lb)   SpO2 96%   BMI 33.30 kg/m    M/S Exam:tender over R heel posteriorly FROM   GENERAL APPEARANCE: overweight and in distress  EXTREMITIES: peripheral pulses normal  SKIN: no suspicious lesions or rashes  NEURO: Normal strength and tone, sensory exam grossly normal, mentation intact and speech normal    X-RAY was done.  I have personally reviewed " the images and find nothing acute.   Radiology to review xrays and I will communicate new findings     ASSESSMENT:  1. Insect bite of right foot, subsequent encounter  Steroid burst  - predniSONE (DELTASONE) 20 MG tablet; Take 1 tablet (20 mg) by mouth daily  Dispense: 7 tablet; Refill: 0    2. Pain of left heel  Will refill her tramadol  Pt   See back in two weeks  - predniSONE (DELTASONE) 20 MG tablet; Take 1 tablet (20 mg) by mouth daily  Dispense: 7 tablet; Refill: 0  - X-ray lt Foot G/E 3 vws*  - traMADol (ULTRAM) 50 MG tablet; TAKE 1/2-1 TABLET BY MOUTH FOUR TIMES DAILY AS NEEDED  Dispense: 90 tablet; Refill: 3  - PHYSICAL THERAPY REFERRAL; Future     Greater than 25 minutes spent with patient discussing risks and benefits and management with possible outcomes regarding this issue with greater than 50% in counseling for medical decision making and coordination of care.

## 2019-10-07 ENCOUNTER — TELEPHONE (OUTPATIENT)
Dept: URGENT CARE | Facility: URGENT CARE | Age: 64
End: 2019-10-07

## 2019-10-07 DIAGNOSIS — B37.31 YEAST INFECTION OF THE VAGINA: Primary | ICD-10-CM

## 2019-10-07 PROCEDURE — 90471 IMMUNIZATION ADMIN: CPT | Performed by: FAMILY MEDICINE

## 2019-10-07 PROCEDURE — 90686 IIV4 VACC NO PRSV 0.5 ML IM: CPT | Performed by: FAMILY MEDICINE

## 2019-10-07 RX ORDER — FLUCONAZOLE 150 MG/1
TABLET ORAL
Qty: 2 TABLET | Refills: 0 | Status: SHIPPED | OUTPATIENT
Start: 2019-10-07 | End: 2021-03-26

## 2019-10-07 NOTE — TELEPHONE ENCOUNTER
Monica was in today for flu shot and reports to Nikki RODRIGUEZ that she believes she has a vaginal yeast infection and is requesting rx to treat.   Called patient for details. Reports vaginal itching. Denies discharge, odor, pelvic pain, abnormal bleeding. Denies urinary symptoms. Reports symptoms since week after antibiotics and prednisone last month. Has not used anything OTC to treat presumed yeast infection. Would prefer pill vs cream. Not had yeast infection in many years, but recalls similar symptoms.      Was on cephalexin 500mg TID x 10 days rx'd by  9/3/19 for insect bite left lower leg. Prednisone 20mg QD x 7 days rx'd on 9/11 for foot pain and bite.     Plan: per Dr. Ty (oncall for ) - ok for fluconazole 150mg #2 take one now and may repeat in 3-5 days if needed. RTC if symptoms persist. Patient agrees. Rx sent to pharmacy.

## 2019-11-04 DIAGNOSIS — M79.672 PAIN OF LEFT HEEL: ICD-10-CM

## 2019-11-05 RX ORDER — TRAMADOL HYDROCHLORIDE 50 MG/1
TABLET ORAL
Qty: 90 TABLET | Refills: 3 | OUTPATIENT
Start: 2019-11-05

## 2019-11-06 RX ORDER — TRAMADOL HYDROCHLORIDE 50 MG/1
TABLET ORAL
Qty: 90 TABLET | Refills: 0 | Status: SHIPPED | OUTPATIENT
Start: 2019-11-06 | End: 2021-05-19

## 2019-11-06 NOTE — TELEPHONE ENCOUNTER
New [place] opioid law effective 7/1/19 requires narcotic rx's to be filled within 30 day of being written/signed by MD. If >30 days have passed since rx written, it is invalid. Prescription written 9/11/19 for #90 with 3 refills, prescription filled 9/11/19 but refills are now invalid. Prescription fill hx per [place] PDMP:  traMADol HCl       Dispensed Days Supply Quantity Provider Pharmacy   TRAMADOL 50MG TABLETS 09/11/2019 22 90 each GABI JIMENEZ [facility]...   TRAMADOL 50MG TABLETS 01/08/2019 22 90 each SAMRA GOOD [facility] #...        Per Dr Jimenez OK for this prescription. Re-entered and routed to Dr Jimenez for approval. Bernadine Montaño

## 2020-01-08 ENCOUNTER — TELEPHONE (OUTPATIENT)
Dept: FAMILY MEDICINE | Facility: CLINIC | Age: 65
End: 2020-01-08

## 2020-01-08 NOTE — TELEPHONE ENCOUNTER
I called to leave message regarding needing thyroid blood tests- was abnormal in May 2018  Seen by another [facility] provider for other issues BUT  Needs med check & blood tests BEFORE next refill  Needs new PCP also  Jacqueline Suarez MA January 8, 2020 11:35 AM

## 2020-04-21 DIAGNOSIS — E03.9 ACQUIRED HYPOTHYROIDISM: ICD-10-CM

## 2020-04-22 RX ORDER — LEVOTHYROXINE SODIUM 50 UG/1
50 TABLET ORAL DAILY
Qty: 30 TABLET | Refills: 0 | Status: SHIPPED | OUTPATIENT
Start: 2020-04-22 | End: 2020-05-14

## 2020-05-05 VITALS — TEMPERATURE: 98.4 F

## 2020-05-05 DIAGNOSIS — I10 BENIGN ESSENTIAL HYPERTENSION: ICD-10-CM

## 2020-05-05 DIAGNOSIS — E78.00 PURE HYPERCHOLESTEROLEMIA: ICD-10-CM

## 2020-05-05 DIAGNOSIS — D50.9 IRON DEFICIENCY ANEMIA, UNSPECIFIED IRON DEFICIENCY ANEMIA TYPE: ICD-10-CM

## 2020-05-05 DIAGNOSIS — E55.9 VITAMIN D DEFICIENCY: ICD-10-CM

## 2020-05-05 DIAGNOSIS — R82.90 ABNORMAL URINALYSIS: ICD-10-CM

## 2020-05-05 DIAGNOSIS — E03.9 ACQUIRED HYPOTHYROIDISM: Primary | ICD-10-CM

## 2020-05-05 DIAGNOSIS — R73.09 ELEVATED HEMOGLOBIN A1C: ICD-10-CM

## 2020-05-05 DIAGNOSIS — R30.0 DYSURIA: ICD-10-CM

## 2020-05-05 LAB
% GRANULOCYTES: 56.7 % (ref 42.2–75.2)
BACTERIA URINE: ABNORMAL
BILIRUB UR QL STRIP: ABNORMAL
BLOOD URINE DIP: ABNORMAL
CASTS/LPF: ABNORMAL
COLOR UR: ABNORMAL
CRYSTAL URINE: ABNORMAL
EPITHELIAL CELLS - QUEST: ABNORMAL
GLUCOSE UR STRIP-MCNC: ABNORMAL MG/DL
HCT VFR BLD AUTO: 36.1 % (ref 35–46)
HEMOGLOBIN: 12.2 G/DL (ref 11.8–15.5)
KETONES UR QL STRIP: ABNORMAL
LEUKOCYTE ESTERASE URINE DIP: ABNORMAL
LYMPHOCYTES NFR BLD AUTO: 35.8 % (ref 20.5–51.1)
MCH RBC QN AUTO: 28.5 PG (ref 27–31)
MCHC RBC AUTO-ENTMCNC: 33.8 G/DL (ref 33–37)
MCV RBC AUTO: 84.5 FL (ref 80–100)
MONOCYTES NFR BLD AUTO: 7.5 % (ref 1.7–9.3)
MUCOUS URINE: ABNORMAL
NITRITE UR QL STRIP: ABNORMAL
PH UR STRIP: 6.5 PH (ref 5–9)
PLATELET # BLD AUTO: 348 K/UL (ref 140–450)
PROT UR QL: ABNORMAL MG/DL (ref ?–0.01)
RBC # BLD AUTO: 4.27 X10/CMM (ref 3.7–5.2)
RBC URINE: ABNORMAL (ref 0–3)
RENAL EPITHELIAL: ABNORMAL
SP GR UR STRIP: 1.02 (ref 1–1.02)
UROBILINOGEN UR QL STRIP: 0.2 EU/DL (ref 0.2–1)
WBC # BLD AUTO: 6.5 X10/CMM (ref 3.8–11)
WBC URINE: ABNORMAL (ref 0–3)

## 2020-05-05 PROCEDURE — 82306 VITAMIN D 25 HYDROXY: CPT | Mod: 90 | Performed by: NURSE PRACTITIONER

## 2020-05-05 PROCEDURE — 83036 HEMOGLOBIN GLYCOSYLATED A1C: CPT | Mod: 90 | Performed by: NURSE PRACTITIONER

## 2020-05-05 PROCEDURE — 81003 URINALYSIS AUTO W/O SCOPE: CPT | Performed by: NURSE PRACTITIONER

## 2020-05-05 PROCEDURE — 84443 ASSAY THYROID STIM HORMONE: CPT | Mod: 90 | Performed by: NURSE PRACTITIONER

## 2020-05-05 PROCEDURE — 80061 LIPID PANEL: CPT | Mod: 90 | Performed by: NURSE PRACTITIONER

## 2020-05-05 PROCEDURE — 85025 COMPLETE CBC W/AUTO DIFF WBC: CPT | Performed by: NURSE PRACTITIONER

## 2020-05-05 PROCEDURE — 36415 COLL VENOUS BLD VENIPUNCTURE: CPT | Performed by: NURSE PRACTITIONER

## 2020-05-05 PROCEDURE — 84439 ASSAY OF FREE THYROXINE: CPT | Mod: 90 | Performed by: NURSE PRACTITIONER

## 2020-05-05 PROCEDURE — 80053 COMPREHEN METABOLIC PANEL: CPT | Mod: 90 | Performed by: NURSE PRACTITIONER

## 2020-05-05 ASSESSMENT — PATIENT HEALTH QUESTIONNAIRE - PHQ9: SUM OF ALL RESPONSES TO PHQ QUESTIONS 1-9: 6

## 2020-05-05 NOTE — PROGRESS NOTES
Fasting labs - A1C, tsh, t4 free, comp, lipid, cbc, vit d  ua - c/o burning with urination off & on x 1 mo  Culture sent due to abnormal findings    Recommend patient see PCP soon to follow up with results  Jacqueline Suarez MA May 5, 2020 9:15 AM

## 2020-05-06 LAB
ALBUMIN SERPL-MCNC: 4.7 G/DL (ref 3.8–4.8)
ALBUMIN/GLOB SERPL: 1.9 {RATIO} (ref 1.2–2.2)
ALP SERPL-CCNC: 94 IU/L (ref 39–117)
ALT SERPL-CCNC: 28 IU/L (ref 0–32)
AST SERPL-CCNC: 19 IU/L (ref 0–40)
BILIRUB SERPL-MCNC: 0.5 MG/DL (ref 0–1.2)
BUN SERPL-MCNC: 10 MG/DL (ref 8–27)
BUN/CREATININE RATIO: 13 (ref 12–28)
CALCIUM SERPL-MCNC: 9.6 MG/DL (ref 8.7–10.3)
CHLORIDE SERPLBLD-SCNC: 102 MMOL/L (ref 96–106)
CHOLEST SERPL-MCNC: 182 MG/DL (ref 100–199)
CREAT SERPL-MCNC: 0.76 MG/DL (ref 0.57–1)
EGFR IF AFRICN AM: 97 ML/MIN/1.73
EGFR IF NONAFRICN AM: 84 ML/MIN/1.73
GLOBULIN, TOTAL: 2.5 G/DL (ref 1.5–4.5)
GLUCOSE SERPL-MCNC: 109 MG/DL (ref 65–99)
HBA1C MFR BLD: 6.1 % (ref 4.8–5.6)
HDLC SERPL-MCNC: 47 MG/DL
LDL/HDL RATIO: 2 RATIO (ref 0–3.2)
LDLC SERPL CALC-MCNC: 93 MG/DL (ref 0–99)
POTASSIUM SERPL-SCNC: 4.2 MMOL/L (ref 3.5–5.2)
PROT SERPL-MCNC: 7.2 G/DL (ref 6–8.5)
SODIUM SERPL-SCNC: 139 MMOL/L (ref 134–144)
T4 FREE SERPL-MCNC: 1.07 NG/DL (ref 0.82–1.77)
TOTAL CO2: 24 MMOL/L (ref 20–29)
TRIGL SERPL-MCNC: 208 MG/DL (ref 0–149)
TSH BLD-ACNC: 6.85 UIU/ML (ref 0.45–4.5)
VITAMIN D, 25-HYDROXY: 52.6 NG/ML (ref 30–100)
VLDLC SERPL CALC-MCNC: 42 MG/DL (ref 5–40)

## 2020-05-07 ENCOUNTER — TELEPHONE (OUTPATIENT)
Dept: FAMILY MEDICINE | Facility: CLINIC | Age: 65
End: 2020-05-07

## 2020-05-07 LAB
Lab: NO GROWTH
URINE CULTURE: NORMAL

## 2020-05-07 NOTE — TELEPHONE ENCOUNTER
Called patient and informed her that her urine culture was normal-no UTI.   Did not discuss other labs as it was advised patient make an appointment to discuss and she is coming in next week to see Sharri.

## 2020-05-14 ENCOUNTER — OFFICE VISIT (OUTPATIENT)
Dept: FAMILY MEDICINE | Facility: CLINIC | Age: 65
End: 2020-05-14

## 2020-05-14 VITALS
HEART RATE: 77 BPM | OXYGEN SATURATION: 95 % | SYSTOLIC BLOOD PRESSURE: 132 MMHG | DIASTOLIC BLOOD PRESSURE: 88 MMHG | HEIGHT: 63 IN | BODY MASS INDEX: 34.2 KG/M2 | TEMPERATURE: 97.9 F | RESPIRATION RATE: 16 BRPM | WEIGHT: 193 LBS

## 2020-05-14 DIAGNOSIS — I10 BENIGN ESSENTIAL HYPERTENSION: ICD-10-CM

## 2020-05-14 DIAGNOSIS — L98.9 SKIN LESION: ICD-10-CM

## 2020-05-14 DIAGNOSIS — E03.9 ACQUIRED HYPOTHYROIDISM: Primary | ICD-10-CM

## 2020-05-14 DIAGNOSIS — E78.00 ELEVATED CHOLESTEROL: ICD-10-CM

## 2020-05-14 PROCEDURE — 99214 OFFICE O/P EST MOD 30 MIN: CPT | Performed by: NURSE PRACTITIONER

## 2020-05-14 RX ORDER — AMLODIPINE BESYLATE 5 MG/1
TABLET ORAL
Qty: 90 TABLET | Refills: 3 | Status: SHIPPED | OUTPATIENT
Start: 2020-05-14 | End: 2021-05-12

## 2020-05-14 RX ORDER — SIMVASTATIN 20 MG
20 TABLET ORAL DAILY
Qty: 90 TABLET | Refills: 3 | Status: SHIPPED | OUTPATIENT
Start: 2020-05-14 | End: 2021-05-12

## 2020-05-14 RX ORDER — LEVOTHYROXINE SODIUM 75 UG/1
75 TABLET ORAL DAILY
Qty: 90 TABLET | Refills: 1 | Status: SHIPPED | OUTPATIENT
Start: 2020-05-14 | End: 2020-11-17

## 2020-05-14 ASSESSMENT — MIFFLIN-ST. JEOR: SCORE: 1399.57

## 2020-05-14 NOTE — PATIENT INSTRUCTIONS
"Schedule in the next week or two with Dr. Davison to have the left leg lesion removed (preferably M, Tues, or Wednesday so that Sharri can assist)  Recheck urine and thyroid labs in six weeks (lab only appt)  Continue the lower salt lower sugar as well as the exercise!  Drink at least two liters (4 Aquafina bottles) of water daily    [email or web address]  Chilling pillow insert \"chillow\"  Bed fan  Bamboo sheets and bamboo pajamas can also help    Return in six months for a physical, we will talk about colonoscopy again at that time  "

## 2020-05-14 NOTE — PROGRESS NOTES
Problem(s) Oriented visit        SUBJECTIVE:                                                    Monica Bryson is a 65 year old female who presents to clinic today for the following health issues :    #1) Hypothyroidism: Taking levothyroxine 50mcg daily an hour before breakfast. Denies side effects with medication. No abnormal changes in weight, bowel patterns, fatigue, bleeding. Does feel hot at night but relates this to chronic issues with night sweats and menopausal vasomotor symptoms.    #2) HTN: Takes amlodipine 5mg daily. Does not regularly check her BPs outside the clinic. No chest pain or pressure at rest or with exertion, SOB/DOMINGO, PND, orthopnea, peripheral edema, or neurologic changes. Follows with her ophthalmologist annually. Limits sodium in her diet, exercises by biking daily.    #3) Hyperlipidemia: Takes simvastatin 20mg daily. She denies severe fatigue, myalgias, or weakness with her statin. She does not eat meat, does eat sticky rice about one cup daily at home. Gets protein from tofu and vegetarian meat substitutes. Tries to limit sugar other than her sticky rice. No smoking, no alcohol.      The 10-year ASCVD risk score (Chuck DC Jr., et al., 2013) is: 5.8%    Values used to calculate the score:      Age: 65 years      Sex: Female      Is Non- : No      Diabetic: No      Tobacco smoker: No      Systolic Blood Pressure: 124 mmHg      Is BP treated: Yes      HDL Cholesterol: 47 mg/dL      Total Cholesterol: 182 mg/dL    #4) Skin lesion: Notes an irregular appearing growth to her left shin x1 year. Feels it is gradually growing over time, no trauma to this region. No pain, itching, or bleeding.    #5) Vasomotor symptoms: Continues to have some hot flashes which bother her most at night, estradiol patch is helpful. She verbalizes risks of the estradiol patch and finds this acceptable, would like to continue this. Hx of hysterectomy, no vaginal bleeding.    Had been  having dysuria, UA demonstrated casts and crystals, UC no growth. No longer symptomatic. Admits she does not drink much water.    Problem list, Medication list, Allergies, and Medical/Social/Surgical histories reviewed in [facility] and updated as appropriate.   Additional history: as documented    ROS:  General, CV, resp, GI, MSK, skin, neuro, endocrine negative except as listed per HPI    Histories:   Patient Active Problem List   Diagnosis     Hepatitis B infection     Anxiety     H/O: hysterectomy     Health Care Home     Advanced directives, counseling/discussion     Hypothyroidism     Benign essential hypertension     Hypercholesteremia     Past Surgical History:   Procedure Laterality Date     TABSO  2-9-2007       Social History     Tobacco Use     Smoking status: Never Smoker     Smokeless tobacco: Never Used   Substance Use Topics     Alcohol use: Yes     Alcohol/week: 0.0 standard drinks     Comment: not at all     No family history on file.      Current Outpatient Medications   Medication Sig Dispense Refill     acetaminophen (TYLENOL) 500 MG tablet Take 1-2 tablets by mouth every 6 hours as needed.       amLODIPine (NORVASC) 5 MG tablet TAKE 1 TABLET(5 MG) BY MOUTH DAILY 90 tablet 3     ascorbic acid (VITAMIN C) 500 MG tablet Take 1,000 mg by mouth daily        ASPIRIN PO Take 81 mg by mouth once a week        cholecalciferol (VITAMIN D3) 125 MCG (5000 UT) TABS tablet Take 10,000 Units by mouth daily       Coenzyme Q10 (CO Q 10) 100 MG CAPS Take 100 mg by mouth daily 90 capsule 3     COMPOUNDED NON-CONTROLLED SUBSTANCE (CMPD RX) - PHARMACY TO MIX COMPOUNDED MEDICATION Estradiol 0.02% cream HRT base 30 g 3     Cyanocobalamin (VITAMIN B 12 PO) Take 1 tablet by mouth every other day       estradiol (ESTRACE VAGINAL) 0.1 MG/GM cream Place 2 g vaginally twice a week 42.5 g 10     ESTRADIOL 0.0375 MG/24HR TD weekly patch UNWRAP AND APPLY 1 PATCH TO SKIN EVERY WEEK 12 patch 0     Flaxseed, Linseed, (FLAX SEED OIL)  "1000 MG capsule Take 1 capsule (1,000 mg) by mouth daily 100 capsule 1     fluconazole (DIFLUCAN) 150 MG tablet Take one tablet now and may repeat in 3-5 days if needed. (Patient not taking: Reported on 5/5/2020) 2 tablet 0     glucosamine-chondroitin 500-400 MG CAPS Take 1 capsule by mouth daily       levothyroxine (SYNTHROID/LEVOTHROID) 50 MCG tablet Take 1 tablet (50 mcg) by mouth daily NEEDS LAB AND TO BE SEEN FOR FURTHER REFILLS 30 tablet 0     simvastatin (ZOCOR) 20 MG tablet Take 1 tablet (20 mg) by mouth daily 90 tablet 3     Soap & Cleansers (ALOEVERA EX)        tiZANidine (ZANAFLEX) 4 MG tablet TAKE 1 TO 2 TABLETS(4 TO 8 MG) BY MOUTH THREE TIMES DAILY AS NEEDED FOR MUSCLE SPASMS (Patient not taking: Reported on 9/3/2019) 90 tablet 0     traMADol (ULTRAM) 50 MG tablet TAKE 1/2-1 TABLET BY MOUTH FOUR TIMES DAILY AS NEEDED 90 tablet 0     Turmeric 500 MG CAPS Take 500 mg by mouth       vitamin B complex with vitamin C (VITAMIN  B COMPLEX) tablet Take 1 tablet by mouth daily         OBJECTIVE:                                                    Physical Exam:    /88   Pulse 77   Temp 97.9  F (36.6  C)   Resp 16   Ht 1.6 m (5' 3\")   Wt 87.5 kg (193 lb)   SpO2 95%   BMI 34.19 kg/m      CONSTITUTIONAL: Alert non-toxic appearing female in no acute distress  HEAD: Normocephalic, atraumatic  EYES: PERRLA; no scleral icterus; sclera without injection  NECK: Neck supple without lymphadenopathy, thyroid without enlargement or nodularity, carotids without bruit  RESPIRATORY: Lungs clear to auscultation, respirations unlabored  CV: Regular rate and rhythm, S1S2, no clicks, murmurs, rubs, or gallops; bilateral lower extremities without edema, dorsalis pedis pulses 2+ bilaterally  GASTROINTESTINAL: Normoactive bowel sounds x4 quadrants, abdomen soft, non-distended, and non-tender to palpation without masses or organomegaly  MUSCULOSKELETAL: Moves all extremities, no obvious muscle wasting  NEUROLOGIC: No gross " deficits, normal gait  SKIN: Appropriate color for race, warm and dry; left shin with 8mm x8mm round raised brown nodular lesion  PSYCHIATRIC: Pleasant and interactive, affect bright, makes appropriate eye contact, thought process logical       ASSESSMENT/PLAN:                                                        Monica was seen today for recheck medication and derm problem.    Diagnoses and all orders for this visit:    Acquired hypothyroidism  -     levothyroxine (SYNTHROID/LEVOTHROID) 75 MCG tablet; Take 1 tablet (75 mcg) by mouth daily    TSH elevated. Increase levothyroxine to 50mcg daily, recheck TSH in 6-8 weeks.    Elevated cholesterol  -     simvastatin (ZOCOR) 20 MG tablet; Take 1 tablet (20 mg) by mouth daily    Continue simvastatin, reviewed low saturated fat low sugar diet along with exercise.    Benign essential hypertension  -     amLODIPine (NORVASC) 5 MG tablet; TAKE 1 TABLET(5 MG) BY MOUTH DAILY    Fair control, DBP remained 88 x2 during visit. Recommend she continue amlodipine but continue to focus on healthy diet, limiting salt, increasing her water intake, exercising.    Skin lesion    Unclear etiology, return to clinic for biopsy given that this lesion is growing over time.    Vasomotor symptoms:    Fair control with estradiol patch. Reviewed non-hormonal therapies as well.    Dysuria:    Resolved, recheck UA in 6-8 weeks after hydrating well given abnormalities found    Patient needs assistance with ADLs: none identified today  Patient needs assistance with iADLs: none identified today    The following health maintenance items are reviewed in Epic and correct as of today:  Health Maintenance   Topic Date Due     HIV SCREENING  02/08/1970     PNEUMOCOCCAL IMMUNIZATION 19-64 MEDIUM RISK (1 of 1 - PPSV23) 02/08/1976     ZOSTER IMMUNIZATION (1 of 2) 02/08/2007     COLORECTAL CANCER SCREENING  10/04/2018     ADVANCE CARE PLANNING  11/21/2018     PREVENTIVE CARE VISIT  12/31/2019     MAMMO  "SCREENING  11/19/2020     LIPID  05/05/2025     DTAP/TDAP/TD IMMUNIZATION (4 - Td) 09/03/2029     HEPATITIS C SCREENING  Completed     PHQ-2  Completed     INFLUENZA VACCINE  Completed     IPV IMMUNIZATION  Aged Out     MENINGITIS IMMUNIZATION  Aged Out     PAP  Discontinued       Patient Instructions   Schedule in the next week or two with Dr. Davison to have the left leg lesion removed (preferably M, Tues, or Wednesday so that Sharri can assist)  Recheck urine and thyroid labs in six weeks (lab only appt)  Continue the lower salt lower sugar as well as the exercise!  Drink at least two liters (4 Aquafina bottles) of water daily    [email or web address]  Chilling pillow insert \"chillow\"  Bed fan  Bamboo sheets and bamboo pajamas can also help    Return in six months for a physical, we will talk about colonoscopy again at that time      CAMRYN Mendes CNP  [facility]  Family Practice  [facility]  [phone]    For any issues my office # is [phone]      "

## 2020-05-20 DIAGNOSIS — Z76.0 ENCOUNTER FOR MEDICATION REFILL: ICD-10-CM

## 2020-05-20 RX ORDER — ESTRADIOL 0.04 MG/D
PATCH TRANSDERMAL
Qty: 12 PATCH | Refills: 0 | Status: SHIPPED | OUTPATIENT
Start: 2020-05-20 | End: 2020-09-01

## 2020-05-26 ENCOUNTER — OFFICE VISIT (OUTPATIENT)
Dept: FAMILY MEDICINE | Facility: CLINIC | Age: 65
End: 2020-05-26

## 2020-05-26 VITALS
HEART RATE: 71 BPM | TEMPERATURE: 97.9 F | DIASTOLIC BLOOD PRESSURE: 88 MMHG | SYSTOLIC BLOOD PRESSURE: 138 MMHG | OXYGEN SATURATION: 97 % | RESPIRATION RATE: 16 BRPM | WEIGHT: 196.6 LBS | BODY MASS INDEX: 34.83 KG/M2

## 2020-05-26 DIAGNOSIS — D48.5 NEOPLASM OF UNCERTAIN BEHAVIOR OF SKIN: Primary | ICD-10-CM

## 2020-05-26 PROCEDURE — 11102 TANGNTL BX SKIN SINGLE LES: CPT | Performed by: FAMILY MEDICINE

## 2020-05-26 RX ORDER — LEVOTHYROXINE SODIUM 50 UG/1
TABLET ORAL
COMMUNITY
Start: 2020-04-22 | End: 2020-05-26

## 2020-05-26 NOTE — PATIENT INSTRUCTIONS
Patient Education     Wound Care    You have a break in the skin. This wound may be because of an injury. Or it may be the result of surgery. Closing the wound helps stop bleeding, protects the wound from infection, and speeds healing. The type of closure that is used depends on the size and location of the wound. Choices include stitches (sutures), strips of surgical tape, skin glue, or staples.  Home care  Your healthcare provider may prescribe medicines for pain. Or he or she may suggest an over-the-counter (OTC) pain reliever, such as ibuprofen. If you have chronic kidney disease, talk with your provider before taking any OTC medicines. Also talk with your provider if you've had a stomach ulcer or gastrointestinal bleeding. In certain cases, antibiotics may be prescribed to help prevent infection. If antibiotics are prescribed, take them exactly as directed for as long as directed. Don't stop taking your antibiotics until they are all gone, even if you feel better.  General care    Follow the healthcare provider s instructions on how to care for the wound.    Wash your hands with soap and warm water before and after caring for the wound. This helps prevent infection.    If a bandage was applied, change it once a day or as directed. If at any time the bandage becomes wet or dirty, replace it with a new one.    Unless told otherwise, avoid soaking the wound in water. Take showers or sponge baths instead of tub baths. Don't scrub or pick at the wound.    Don't go swimming.    If you have a bandage and it gets wet, use a clean cloth to gently pat the wound dry. Then replace the bandage with a dry one.    Don't scratch, rub, or pick at the area.    Watch for the signs of infection listed below. Any wound can get infected, even if you are taking antibiotics. Seek care right away if you see any possible signs of infection.  Care for specific closures    Stitches. You may want to clean the wound daily after the first 2  to 3 days. To do this, remove the bandage and gently wash the area with soap and warm water. After cleaning, apply a thin layer of antibiotic ointment if recommended. Then apply a new bandage. Stitches es on the outside of the skin usually need to be removed by your healthcare provider.    Surgical tape. Keep the area dry. If it gets wet, blot it dry with a towel. Surgical tape closures usually fall off within 7 to 10 days. If they have not fallen off after 10 days, you can remove them yourself. To remove the tape, use mineral oil or petroleum jelly on a cotton ball to gently rub the adhesive.    Skin glue. You may shower or bathe as usual, but don't use soaps, lotions, or ointments on the wound area. Don't scrub the wound. After bathing, pat the wound dry with a soft towel. Don't apply liquids like peroxide, ointments, or creams to the wound while the strips or film is in place. Don't scratch, rub, or pick at the strips or film. Don't put tape directly over the strips or film. Skin adhesive film will fall off naturally in 5 to 10 days. If it does not peel off in 10 days, gently rub petroleum jelly or an ointment onto the film.    [place]. Take showers or sponge baths. Don't take tub baths. Don't use lotions on the wound area. The area may be cleaned with soap and water 2 to 3 days after the wound was stapled. Don't scrub the wound. Pat it dry with a clean soft cloth or towel. You can use antibiotic ointment if your provider tells you to. Staples will need to be removed by your healthcare provider in 10 to 14 days.  Follow-up care  Follow up with your healthcare provider, or as directed. If you have stitches or staples, return for their removal as directed.  When to seek medical advice  Call your healthcare provider right away if you have signs of infection:    Fever of 100.4 F (38 C) or higher, or as directed by your healthcare provider    Increasing pain in the wound    Increasing redness or swelling    Pus or  bad-smelling drainage from the wound  Also call your provider right away if any of these occur:    Wound bleeds more than a small amount or won t stop bleeding    Wound edges come apart    Numbness or weakness in the wound area that doesn t go away  Date Last Reviewed: 6/1/2018 2000-2019 [employer]. [address] [place] [address], [place] [address]. All rights reserved. This information is not intended as a substitute for professional medical care. Always follow your healthcare professional's instructions.

## 2020-05-26 NOTE — PROGRESS NOTES
Patient is here for scheduled biopsy of changing left shin lesion.    PROCEDURE:    After potential risks and alternative options were discussed, written consent was obtained. The lesion was outlined with a surgical marking pen and measured 8 x 8 mm.  Under sterile precautions, using a 30 gauge needle, approximately 3 ml of 1% lidocaine with epinephrine was infiltrated beneath the lesion.  Using a dermablade the lesion was shaved with minimal residual abnormal appearing tissue.  Hemostasis was achieved with direct pressure and drysol.  Vaseline was applied and a dressing was placed.  There was minimal blood loss.  The patient tolerated the procedure well and there were no immediate complications.  The specimen was labeled and sent to pathology for review.  Proper wound care and reasons to follow up were discussed and understood.    We did discuss that as the lesion is slightly pigmented full elliptical excision could be considered but due to the location on the shin with high skin tension, the size of the lesion, the homogeneity of the lesion, and the low suspicion for melanoma, a deep shave was felt most appropriate.    Further follow up pending results.

## 2020-05-29 LAB
.: NORMAL
CLINICIAN PROVIDED ICD10: NORMAL
PATHOLOGIST PROVIDED ICD10: NORMAL

## 2020-06-01 ENCOUNTER — TELEPHONE (OUTPATIENT)
Dept: FAMILY MEDICINE | Facility: CLINIC | Age: 65
End: 2020-06-01

## 2020-06-01 NOTE — TELEPHONE ENCOUNTER
Called patient with pathology result.  Informed her that biopsy showed a benign lesion called a dermatofibroma.  No follow up is needed.

## 2020-06-29 DIAGNOSIS — E03.9 ACQUIRED HYPOTHYROIDISM: Primary | ICD-10-CM

## 2020-06-29 DIAGNOSIS — R82.90 ABNORMAL URINALYSIS: ICD-10-CM

## 2020-06-29 PROCEDURE — 84443 ASSAY THYROID STIM HORMONE: CPT | Mod: 90 | Performed by: NURSE PRACTITIONER

## 2020-06-29 PROCEDURE — 81003 URINALYSIS AUTO W/O SCOPE: CPT | Performed by: NURSE PRACTITIONER

## 2020-06-29 PROCEDURE — 36415 COLL VENOUS BLD VENIPUNCTURE: CPT | Performed by: NURSE PRACTITIONER

## 2020-06-30 LAB — TSH BLD-ACNC: 4.36 UIU/ML (ref 0.45–4.5)

## 2020-07-01 LAB
Lab: NORMAL
URINE CULTURE: NORMAL

## 2020-07-02 ENCOUNTER — TELEPHONE (OUTPATIENT)
Dept: FAMILY MEDICINE | Facility: CLINIC | Age: 65
End: 2020-07-02

## 2020-07-02 NOTE — TELEPHONE ENCOUNTER
Called patient with lab result.  Informed her of normal TSH. She should continue with her same dose of levothyroxine.

## 2020-07-07 ENCOUNTER — TELEPHONE (OUTPATIENT)
Dept: FAMILY MEDICINE | Facility: CLINIC | Age: 65
End: 2020-07-07

## 2020-07-07 LAB
BACTERIA URINE: ABNORMAL
BILIRUB UR QL STRIP: ABNORMAL
BLOOD URINE DIP: ABNORMAL
CASTS/LPF: ABNORMAL
COLOR UR: YELLOW
CRYSTAL URINE: ABNORMAL
EPITHELIAL CELLS - QUEST: ABNORMAL
GLUCOSE UR STRIP-MCNC: ABNORMAL MG/DL
KETONES UR QL STRIP: ABNORMAL
LEUKOCYTE ESTERASE URINE DIP: ABNORMAL
MUCOUS URINE: ABNORMAL
NITRITE UR QL STRIP: ABNORMAL
PH UR STRIP: 6.5 PH (ref 5–9)
PROT UR QL: ABNORMAL MG/DL (ref ?–0.01)
RBC URINE: 0 (ref 0–3)
SP GR UR STRIP: 1.01 (ref 1–1.02)
UROBILINOGEN UR QL STRIP: 0.2 EU/DL (ref 0.2–1)
WBC URINE: ABNORMAL (ref 0–3)

## 2020-07-07 NOTE — TELEPHONE ENCOUNTER
Called patient with urine and urine culture results.  Informed her that there was no growth on urine culture.  Urine was improved from last time with protein and bilirubin resolved.  Patient will call if she experiences any UTI symptoms.

## 2020-09-01 DIAGNOSIS — Z76.0 ENCOUNTER FOR MEDICATION REFILL: ICD-10-CM

## 2020-09-01 RX ORDER — ESTRADIOL 0.04 MG/D
PATCH TRANSDERMAL
Qty: 12 PATCH | Refills: 0 | Status: SHIPPED | OUTPATIENT
Start: 2020-09-01 | End: 2020-12-16

## 2020-10-09 VITALS — TEMPERATURE: 97.8 F

## 2020-10-09 DIAGNOSIS — Z23 FLU VACCINE NEED: Primary | ICD-10-CM

## 2020-10-09 PROCEDURE — 90471 IMMUNIZATION ADMIN: CPT | Performed by: NURSE PRACTITIONER

## 2020-10-09 PROCEDURE — 90686 IIV4 VACC NO PRSV 0.5 ML IM: CPT | Performed by: NURSE PRACTITIONER

## 2020-10-15 ENCOUNTER — ALLIED HEALTH/NURSE VISIT (OUTPATIENT)
Dept: NURSING | Facility: CLINIC | Age: 65
End: 2020-10-15
Payer: COMMERCIAL

## 2020-10-15 ENCOUNTER — TELEPHONE (OUTPATIENT)
Dept: FAMILY MEDICINE | Facility: CLINIC | Age: 65
End: 2020-10-15

## 2020-10-15 DIAGNOSIS — Z12.11 COLON CANCER SCREENING: Primary | ICD-10-CM

## 2020-10-15 DIAGNOSIS — Z23 NEED FOR VACCINATION: Primary | ICD-10-CM

## 2020-10-15 PROCEDURE — 90750 HZV VACC RECOMBINANT IM: CPT

## 2020-10-15 PROCEDURE — 99207 PR NO CHARGE NURSE ONLY: CPT

## 2020-10-15 PROCEDURE — 90471 IMMUNIZATION ADMIN: CPT

## 2020-10-15 NOTE — TELEPHONE ENCOUNTER
Patient called requesting order for colonoscopy. Her last was 10/2013-she was to repeat in 5 years and is overdue at this time. Per Sharri COWAN CNP order entered-patient requests [facility]. Bernadine Montaño

## 2020-10-15 NOTE — PROGRESS NOTES
Prior to immunization administration, verified patients identity using patient s name and date of birth. Please see Immunization Activity for additional information.     Screening Questionnaire for Adult Immunization    Are you sick today?   No   Do you have allergies to medications, food, a vaccine component or latex?   No   Have you ever had a serious reaction after receiving a vaccination?   No   Do you have a long-term health problem with heart, lung, kidney, or metabolic disease (e.g., diabetes), asthma, a blood disorder, no spleen, complement component deficiency, a cochlear implant, or a spinal fluid leak?  Are you on long-term aspirin therapy?   No   Do you have cancer, leukemia, HIV/AIDS, or any other immune system problem?   No   Do you have a parent, brother, or sister with an immune system problem?   No   In the past 3 months, have you taken medications that affect  your immune system, such as prednisone, other steroids, or anticancer drugs; drugs for the treatment of rheumatoid arthritis, Crohn s disease, or psoriasis; or have you had radiation treatments?   No   Have you had a seizure, or a brain or other nervous system problem?   No   During the past year, have you received a transfusion of blood or blood    products, or been given immune (gamma) globulin or antiviral drug?   No   For women: Are you pregnant or is there a chance you could become       pregnant during the next month?   No   Have you received any vaccinations in the past 4 weeks?   No     Immunization questionnaire answers were all negative.        Per orders of , injection of Shingrix #1 given by Carolina Gresham MA. Patient instructed to remain in clinic for 15 minutes afterwards, and to report any adverse reaction to me immediately.  Patient verified     Screening performed by Carolina Gresham MA on 10/15/2020 at 12:00 PM.

## 2020-11-13 DIAGNOSIS — E03.9 ACQUIRED HYPOTHYROIDISM: ICD-10-CM

## 2020-11-17 RX ORDER — LEVOTHYROXINE SODIUM 75 UG/1
75 TABLET ORAL DAILY
Qty: 90 TABLET | Refills: 1 | Status: SHIPPED | OUTPATIENT
Start: 2020-11-17 | End: 2021-05-17

## 2020-12-21 ENCOUNTER — ALLIED HEALTH/NURSE VISIT (OUTPATIENT)
Dept: NURSING | Facility: CLINIC | Age: 65
End: 2020-12-21
Payer: COMMERCIAL

## 2020-12-21 DIAGNOSIS — Z23 NEED FOR VACCINATION: Primary | ICD-10-CM

## 2020-12-21 PROCEDURE — 90471 IMMUNIZATION ADMIN: CPT

## 2020-12-21 PROCEDURE — 90750 HZV VACC RECOMBINANT IM: CPT

## 2020-12-21 NOTE — NURSING NOTE
Prior to immunization administration, verified patients identity using patient s name and date of birth. Please see Immunization Activity for additional information.     Screening Questionnaire for Adult Immunization    Are you sick today?   No   Do you have allergies to medications, food, a vaccine component or latex?   No   Have you ever had a serious reaction after receiving a vaccination?   No   Do you have a long-term health problem with heart, lung, kidney, or metabolic disease (e.g., diabetes), asthma, a blood disorder, no spleen, complement component deficiency, a cochlear implant, or a spinal fluid leak?  Are you on long-term aspirin therapy?   No   Do you have cancer, leukemia, HIV/AIDS, or any other immune system problem?   No   Do you have a parent, brother, or sister with an immune system problem?   No   In the past 3 months, have you taken medications that affect  your immune system, such as prednisone, other steroids, or anticancer drugs; drugs for the treatment of rheumatoid arthritis, Crohn s disease, or psoriasis; or have you had radiation treatments?   No   Have you had a seizure, or a brain or other nervous system problem?   No   During the past year, have you received a transfusion of blood or blood    products, or been given immune (gamma) globulin or antiviral drug?   No   For women: Are you pregnant or is there a chance you could become       pregnant during the next month?   No   Have you received any vaccinations in the past 4 weeks?   No     Immunization questionnaire answers were all negative.        Per orders of Sharri Gonzalez CNP, injection of Shingrix #2 given by Carolina Gresham MA. Patient instructed to remain in clinic for 15 minutes afterwards, and to report any adverse reaction to me immediately.       Screening performed by Carolina Gresham MA on 12/21/2020 at 12:03 PM.

## 2021-03-16 ENCOUNTER — HOSPITAL ENCOUNTER (OUTPATIENT)
Dept: MAMMOGRAPHY | Facility: CLINIC | Age: 66
Discharge: HOME | End: 2021-03-16
Attending: NURSE PRACTITIONER | Admitting: NURSE PRACTITIONER
Payer: COMMERCIAL

## 2021-03-16 DIAGNOSIS — Z12.31 VISIT FOR SCREENING MAMMOGRAM: ICD-10-CM

## 2021-03-16 PROCEDURE — 77067 SCR MAMMO BI INCL CAD: CPT

## 2021-03-26 ENCOUNTER — VIRTUAL VISIT (OUTPATIENT)
Dept: FAMILY MEDICINE | Facility: CLINIC | Age: 66
End: 2021-03-26

## 2021-03-26 DIAGNOSIS — Z20.822 EXPOSURE TO COVID-19 VIRUS: ICD-10-CM

## 2021-03-26 DIAGNOSIS — Z20.822 EXPOSURE TO COVID-19 VIRUS: Primary | ICD-10-CM

## 2021-03-26 PROCEDURE — U0005 INFEC AGEN DETEC AMPLI PROBE: HCPCS | Mod: 90 | Performed by: PATHOLOGY

## 2021-03-26 PROCEDURE — U0003 INFECTIOUS AGENT DETECTION BY NUCLEIC ACID (DNA OR RNA); SEVERE ACUTE RESPIRATORY SYNDROME CORONAVIRUS 2 (SARS-COV-2) (CORONAVIRUS DISEASE [COVID-19]), AMPLIFIED PROBE TECHNIQUE, MAKING USE OF HIGH THROUGHPUT TECHNOLOGIES AS DESCRIBED BY CMS-2020-01-R: HCPCS | Mod: 90 | Performed by: PATHOLOGY

## 2021-03-26 PROCEDURE — 99213 OFFICE O/P EST LOW 20 MIN: CPT | Mod: 95 | Performed by: FAMILY MEDICINE

## 2021-03-26 NOTE — PATIENT INSTRUCTIONS
Instructions for Patients  Your symptoms show that you may have coronavirus (COVID-19). This illness can cause fever, cough and trouble breathing. Many people get a mild case and get better on their own. Some people can get very sick.         How can I protect others from COVID-19?    Stay home and away from others (self-isolate) until:    At least 10 days have passed since your symptoms started, And     You ve had no fever--and no medicine that reduces fever--for 1 full day (24 hours), And      Your other symptoms have resolved (gotten better).     During this time:    Stay in your own room (and use your own bathroom), if you can.    Stay away from others in your home. No hugging, kissing or shaking hands.    No visitors.    Don t go to work, school or anywhere else.     Clean  high touch  surfaces often (doorknobs, counters, handles, etc.). Use a household cleaning spray or wipes.    Cover your mouth and nose with a mask, tissue or wash cloth to avoid spreading germs.    Wash your hands and face often. Use soap and water.    For more tips, go to https://www.cdc.gov/coronavirus/2019-ncov/downloads/10Things.pdf.    How can I take care of myself?    1. Get lots of rest. Drink extra fluids (unless a doctor has told you not to).     2. Take Tylenol (acetaminophen) for fever or pain. If you have liver or kidney problems, ask your family doctor if it's okay to take Tylenol.   Adults can take either:     650 mg (two 325 mg pills) every 4 to 6 hours, or     1,000 mg (two 500 mg pills) every 8 hours as needed.     Note: Don't take more than 3,000 mg in one day.   Acetaminophen is found in many medicines (both prescribed and over-the-counter medicines). Read all labels to be sure you don't take too much.   For children, check the Tylenol bottle for the right dose. The dose is based on the child's age or weight.    3. If you have other health problems (like cancer, heart failure, an organ transplant or severe kidney  disease): Call your specialty clinic if you don't feel better in the next 2 days.  4. Know when to call 911: If your breathing is so bad that it keeps you from doing normal activities, call 911 or go to the emergency room. Tell them that you've been staying home and may have COVID-19.      Thank you for taking steps to prevent the spread of this virus.  o Limit your contact with others.  o Wear a simple mask to cover your cough.  o Wash your hands well and often.  o If you need medical care, go to [email or web address] or contact your health care provider.     For more about COVID-19 and caring for yourself at home, visit the CDC website at https://www.cdc.gov/coronavirus/2019-ncov/about/steps-when-sick.html.     To learn about care at [facility], please go to [email or web address].     [facility] clinical trials (COVID-19 research studies): [email or web address]-clinical-trials.    Below are the COVID-19 hotlines at the [place] [facility] ([facility]). Interpreters are available.     For health questions: Call 306-560-4456 or 1-583.609.7575 (7 a.m. to 7 p.m.)    For questions about schools and childcare: Call 590-571-7218 or 1-860.829.5187 (7 a.m. to 7 p.m.)      Thank you for limiting contact with others, wearing a simple mask to cover your cough, practice good hand hygiene habits and accessing our virtual services where possible to limit the spread of this virus.    For more information about COVID19 and options for caring for yourself at home, please visit the CDC website at https://www.cdc.gov/coronavirus/2019-ncov/about/steps-when-sick.html  For more options for care at [facility], please visit our website at [email or web address]

## 2021-03-26 NOTE — PROGRESS NOTES
"    Video Problem(s) Oriented visit      Video Start Time: 9:01 AM    The patient has been notified of following:     \"This video visit will be conducted via a call between you and your physician/provider. We have found that certain health care needs can be provided without the need for an in-person physical exam.  This service lets us provide the care you need with a video conversation.  If a prescription is necessary we can send it directly to your pharmacy.  If lab work is needed we can place an order for that and you can then stop by our lab to have the test done at a later time.    Video visits are billed at different rates depending on your insurance coverage.  Please reach out to your insurance provider with any questions.    If during the course of the call the physician/provider feels a video visit is not appropriate, you will not be charged for this service.\"    Patient has given verbal consent for Video visit? Yes    How would you like to obtain your AVS? MyChart    Will anyone else be joining your video visit? No  Problem(s) Oriented visit        SUBJECTIVE:                                                    Monica Bryson is a 66 year old female who presents to clinic today for the following health issues :   has covid    Concern for COVID-19  About how many days ago did these symptoms start? Feels ok  Is this your first visit for this illness? Yes  In the 14 days before your symptoms started, have you had close contact with someone with COVID-19 (Coronavirus)? Yes, I have been in contact with someone who has COVID-19/Coronavirus (confirmed by lab test).  Do you have a fever or chills? No  Are you having new or worsening difficulty breathing? No  Do you have new or worsening cough? No  Have you had any new or unexplained body aches? No    Have you experienced any of the following NEW symptoms?    Headache: No    Sore throat: No    Loss of taste or smell: No    Chest pain: No    Diarrhea: " May 6, 2019      Andrei Miller MD  [address] [facility]  [place] [address]           [facility]  [address] [place] [address]  [facility] [address]  Phone: [phone]          Patient: Tomeka Burns   MR Number: [id number]   YOB: 1951   Date of Visit: 5/6/2019       Dear Dr. Andrei Miller:    Thank you for referring Tomeka Burns to me for evaluation. Attached you will find relevant portions of my assessment and plan of care.    If you have questions, please do not hesitate to call me. I look forward to following Tomeka Burns along with you.    Sincerely,    Karlo Gomez Jr., MD    Enclosure  CC:  No Recipients    If you would like to receive this communication electronically, please contact [email or web address] or [phone] to request more information on [employer] access.    For providers and/or their staff who would like to refer a patient to [facility], please contact us through our one-stop-shop provider referral line, [facility], at [phone].    If you feel you have received this communication in error or would no longer like to receive these types of communications, please e-mail [email or web address]          No    Rash: No  What treatments have you tried? none  Who do you live with?   Are you, or a household member, a healthcare worker or a ? No  Do you live in a nursing home, group home, or shelter? No  Do you have a way to get food/medications if quarantined? Yes, I have a friend or family member who can help me.          There are no diagnoses linked to this encounter.     Problem list, Medication list, Allergies, and Medical/Social/Surgical histories reviewed in EPIC and updated as appropriate.   Additional history: as documented    ROS:  General and Resp. completed and negative except as noted above    Histories:   Patient Active Problem List   Diagnosis     Hepatitis B infection     Anxiety     H/O: hysterectomy     Health Care Home     Advanced directives, counseling/discussion     Hypothyroidism     Benign essential hypertension     Hypercholesteremia     Past Surgical History:   Procedure Laterality Date     TABSO  2-9-2007       Social History     Tobacco Use     Smoking status: Never Smoker     Smokeless tobacco: Never Used   Substance Use Topics     Alcohol use: Yes     Alcohol/week: 0.0 standard drinks     Comment: not at all     No family history on file.        OBJECTIVE:                                                    There were no vitals taken for this visit.  There is no height or weight on file to calculate BMI.   APPEARANCE: = Relaxed and in no distress     ASSESSMENT/PLAN:                                                        Monica was seen today for consult.    Diagnoses and all orders for this visit:    Exposure to COVID-19 virus  -     Asymptomatic COVID-19 Virus (Coronavirus) by PCR; Future        Regular exercise  Patient Instructions     Instructions for Patients      Thank you for limiting contact with others, wearing a simple mask to cover your cough, practice good hand hygiene habits and accessing our virtual services where possible to limit the spread of this  virus.    For more information about COVID19 and options for caring for yourself at home, please visit the CDC website at https://www.cdc.gov/coronavirus/2019-ncov/about/steps-when-sick.html  For more options for care at [facility], please visit our website at [email or web address]      The following health maintenance items are reviewed in Epic and correct as of today:  Health Maintenance   Topic Date Due     HIV SCREENING  Never done     COVID-19 Vaccine (1) Never done     ADVANCE CARE PLANNING  11/21/2018     PREVENTIVE CARE VISIT  12/31/2019     PHQ-2  01/01/2021     MAMMO SCREENING  03/16/2023     LIPID  05/05/2025     COLORECTAL CANCER SCREENING  12/16/2027     DTAP/TDAP/TD IMMUNIZATION (4 - Td) 09/03/2029     HEPATITIS C SCREENING  Completed     INFLUENZA VACCINE  Completed     ZOSTER IMMUNIZATION  Completed     Pneumococcal Vaccine: Pediatrics (0 to 5 Years) and At-Risk Patients (6 to 64 Years)  Aged Out     IPV IMMUNIZATION  Aged Out     MENINGITIS IMMUNIZATION  Aged Out     PAP  Discontinued       Maciel Cabello MD  [facility]  [phone]    For any issues my office # is [phone]            Video-Visit Details    This visit is being conducted as a virtual visit due to the emphasis on mitigation of the COVID-19 virus pandemic.   Type of service:  Video Visit    Originating Location (pt. Location): Home    Distant Location (provider location):  [facility]     Platform used for Video Visit: Unable to connect with video, audio only available    Maciel Cabello MD  [facility]  [phone]    Video End Time:9:17 AM  For any issues my office # is [phone]

## 2021-03-27 LAB
LABORATORY COMMENT REPORT: NORMAL
SARS-COV-2 RNA RESP QL NAA+PROBE: NEGATIVE
SARS-COV-2 RNA RESP QL NAA+PROBE: NORMAL
SPECIMEN SOURCE: NORMAL
SPECIMEN SOURCE: NORMAL

## 2021-04-15 DIAGNOSIS — Z76.0 ENCOUNTER FOR MEDICATION REFILL: ICD-10-CM

## 2021-04-15 RX ORDER — ESTRADIOL 0.04 MG/D
PATCH TRANSDERMAL
Qty: 12 PATCH | Refills: 0 | Status: SHIPPED | OUTPATIENT
Start: 2021-04-15 | End: 2021-05-19

## 2021-04-15 NOTE — TELEPHONE ENCOUNTER
estradiol (CLIMARA) 0.0375 MG/24HR weekly patch    LOV 5/26/20  Last labs 5/5/20    Left message to call back [facility]  Needs med check /cpx and fasting labs before next refill  April 15, 2021  Jacqueline Suarez MA

## 2021-05-09 ENCOUNTER — HEALTH MAINTENANCE LETTER (OUTPATIENT)
Age: 66
End: 2021-05-09

## 2021-05-12 DIAGNOSIS — I10 BENIGN ESSENTIAL HYPERTENSION: ICD-10-CM

## 2021-05-12 DIAGNOSIS — E78.00 ELEVATED CHOLESTEROL: ICD-10-CM

## 2021-05-12 RX ORDER — SIMVASTATIN 20 MG
20 TABLET ORAL DAILY
Qty: 90 TABLET | Refills: 3 | Status: SHIPPED | OUTPATIENT
Start: 2021-05-12 | End: 2022-05-09

## 2021-05-12 RX ORDER — AMLODIPINE BESYLATE 5 MG/1
TABLET ORAL
Qty: 90 TABLET | Refills: 3 | Status: SHIPPED | OUTPATIENT
Start: 2021-05-12 | End: 2021-05-19

## 2021-05-17 DIAGNOSIS — E03.9 ACQUIRED HYPOTHYROIDISM: ICD-10-CM

## 2021-05-17 RX ORDER — LEVOTHYROXINE SODIUM 75 UG/1
75 TABLET ORAL DAILY
Qty: 90 TABLET | Refills: 1 | Status: SHIPPED | OUTPATIENT
Start: 2021-05-17 | End: 2021-11-09

## 2021-05-19 ENCOUNTER — OFFICE VISIT (OUTPATIENT)
Dept: FAMILY MEDICINE | Facility: CLINIC | Age: 66
End: 2021-05-19

## 2021-05-19 VITALS
DIASTOLIC BLOOD PRESSURE: 82 MMHG | WEIGHT: 193 LBS | HEART RATE: 69 BPM | SYSTOLIC BLOOD PRESSURE: 132 MMHG | BODY MASS INDEX: 34.2 KG/M2 | HEIGHT: 63 IN | OXYGEN SATURATION: 98 %

## 2021-05-19 DIAGNOSIS — M85.89 OSTEOPENIA OF MULTIPLE SITES: ICD-10-CM

## 2021-05-19 DIAGNOSIS — E66.811 CLASS 1 OBESITY DUE TO EXCESS CALORIES WITH SERIOUS COMORBIDITY AND BODY MASS INDEX (BMI) OF 34.0 TO 34.9 IN ADULT: ICD-10-CM

## 2021-05-19 DIAGNOSIS — E03.9 ACQUIRED HYPOTHYROIDISM: ICD-10-CM

## 2021-05-19 DIAGNOSIS — N95.1 VASOMOTOR SYMPTOMS DUE TO MENOPAUSE: ICD-10-CM

## 2021-05-19 DIAGNOSIS — Z00.00 ENCOUNTER FOR ANNUAL WELLNESS VISIT (AWV) IN MEDICARE PATIENT: Primary | ICD-10-CM

## 2021-05-19 DIAGNOSIS — I10 BENIGN ESSENTIAL HYPERTENSION: ICD-10-CM

## 2021-05-19 DIAGNOSIS — Z11.4 SCREENING FOR HIV (HUMAN IMMUNODEFICIENCY VIRUS): ICD-10-CM

## 2021-05-19 DIAGNOSIS — E78.00 ELEVATED CHOLESTEROL: ICD-10-CM

## 2021-05-19 DIAGNOSIS — Z71.89 ADVANCED DIRECTIVES, COUNSELING/DISCUSSION: ICD-10-CM

## 2021-05-19 DIAGNOSIS — R73.09 ELEVATED HEMOGLOBIN A1C: ICD-10-CM

## 2021-05-19 DIAGNOSIS — E66.09 CLASS 1 OBESITY DUE TO EXCESS CALORIES WITH SERIOUS COMORBIDITY AND BODY MASS INDEX (BMI) OF 34.0 TO 34.9 IN ADULT: ICD-10-CM

## 2021-05-19 PROCEDURE — 99214 OFFICE O/P EST MOD 30 MIN: CPT | Mod: 25 | Performed by: NURSE PRACTITIONER

## 2021-05-19 PROCEDURE — G0438 PPPS, INITIAL VISIT: HCPCS | Performed by: NURSE PRACTITIONER

## 2021-05-19 PROCEDURE — 93050 ART PRESSURE WAVEFORM ANALYS: CPT | Performed by: NURSE PRACTITIONER

## 2021-05-19 PROCEDURE — 36415 COLL VENOUS BLD VENIPUNCTURE: CPT | Performed by: NURSE PRACTITIONER

## 2021-05-19 RX ORDER — AMLODIPINE BESYLATE 10 MG/1
TABLET ORAL
Qty: 90 TABLET | Refills: 3 | Status: SHIPPED | OUTPATIENT
Start: 2021-05-19 | End: 2022-05-23

## 2021-05-19 ASSESSMENT — ANXIETY QUESTIONNAIRES
IF YOU CHECKED OFF ANY PROBLEMS ON THIS QUESTIONNAIRE, HOW DIFFICULT HAVE THESE PROBLEMS MADE IT FOR YOU TO DO YOUR WORK, TAKE CARE OF THINGS AT HOME, OR GET ALONG WITH OTHER PEOPLE: NOT DIFFICULT AT ALL
GAD7 TOTAL SCORE: 2
6. BECOMING EASILY ANNOYED OR IRRITABLE: NOT AT ALL
2. NOT BEING ABLE TO STOP OR CONTROL WORRYING: NOT AT ALL
5. BEING SO RESTLESS THAT IT IS HARD TO SIT STILL: NOT AT ALL
1. FEELING NERVOUS, ANXIOUS, OR ON EDGE: NOT AT ALL
7. FEELING AFRAID AS IF SOMETHING AWFUL MIGHT HAPPEN: SEVERAL DAYS
3. WORRYING TOO MUCH ABOUT DIFFERENT THINGS: SEVERAL DAYS

## 2021-05-19 ASSESSMENT — MIFFLIN-ST. JEOR: SCORE: 1386.63

## 2021-05-19 ASSESSMENT — PATIENT HEALTH QUESTIONNAIRE - PHQ9
SUM OF ALL RESPONSES TO PHQ QUESTIONS 1-9: 6
5. POOR APPETITE OR OVEREATING: NOT AT ALL

## 2021-05-19 NOTE — PATIENT INSTRUCTIONS
Preventive Health Recommendations  Female Ages 50 - 64    Yearly exam: See your health care provider every year in order to  o Review health changes.   o Discuss preventive care.    o Review your medicines if your doctor has prescribed any.      Get a Pap test every three years (unless you have an abnormal result and your provider advises testing more often).    If you get Pap tests with HPV test, you only need to test every 5 years, unless you have an abnormal result.     You do not need a Pap test if your uterus was removed (hysterectomy) and you have not had cancer.    You should be tested each year for STDs (sexually transmitted diseases) if you're at risk.     Have a mammogram every 1 to 2 years.    Have a colonoscopy at age 50, or have a yearly FIT test (stool test). These exams screen for colon cancer.      Have a cholesterol test every 5 years, or more often if advised.    Have a diabetes test (fasting glucose) every three years. If you are at risk for diabetes, you should have this test more often.     If you are at risk for osteoporosis (brittle bone disease), think about having a bone density scan (DEXA).    Shots: Get a flu shot each year. Get a tetanus shot every 10 years.    Nutrition:     Eat at least 5 servings of fruits and vegetables each day.    Eat whole-grain bread, whole-wheat pasta and brown rice instead of white grains and rice.    Get adequate Calcium and Vitamin D.     Lifestyle    Exercise at least 150 minutes a week (30 minutes a day, 5 days a week). This will help you control your weight and prevent disease.    Limit alcohol to one drink per day.    No smoking.     Wear sunscreen to prevent skin cancer.     See your dentist every six months for an exam and cleaning.    See your eye doctor every 1 to 2 years.    STOP the Climara patch- let me know if you have worsening hot flashes and want to restart this    Increase the amlodipine to 10mg daily- goal is for blood pressure to be closer  to 110s/70s, but let Sharri know if you don't tolerate this or if your legs swell too much with this. Return in one month for recheck.    Fill out advanced care planning form and bring back

## 2021-05-19 NOTE — PROGRESS NOTES
SUBJECTIVE:   CC: Monica Bryson is an 66 year old woman who presents for preventive health visit.       Patient has been advised of split billing requirements and indicates understanding: Yes  Healthy Habits:    Do you get at least three servings of calcium containing foods daily (dairy, green leafy vegetables, etc.)? yes    Amount of exercise or daily activities, outside of work: 2 day(s) per week    Problems taking medications regularly No    Medication side effects: No    Have you had an eye exam in the past two years? yes    Do you see a dentist twice per year? No - last in 2019 (Covid)    Do you have sleep apnea, excessive snoring or daytime drowsiness?no    Feeling more down about COVID, denies need for intervention  PHQ 5/5/2020   PHQ-9 Total Score 6   Q9: Thoughts of better off dead/self-harm past 2 weeks Not at all     No flowsheet data found.        HTN: Reports BPs normal at home, more elevated here- feels due to stress today. Tolerating amlodipine 5mg without incident. No chest pain or pressure, SOB/DOMINGO, peripheral edema, or neurologic changes.  Hypothyroidism: Feeling euthyroid on levothyroxine 75mcg, last TSH 6/2020 4.3.  Menopausal: BSO at age 52, on Climara patch. Hot flashes, night sweats, vaginal dryness improved with this. Open to trialing off of this.   HLD: On simvastatin 20mg. Occasional cramping in the left leg. No exertional symptoms.      Today's PHQ-2 Score:   PHQ-2 ( 1999 Pfizer) 5/19/2021 5/5/2020   Q1: Little interest or pleasure in doing things 1 2   Q2: Feeling down, depressed or hopeless 1 2   PHQ-2 Score 2 4       Abuse: Current or Past(Physical, Sexual or Emotional)- No  Do you feel safe in your environment? Yes    Have you ever done Advance Care Planning? (For example, a Health Directive, POLST, or a discussion with a medical provider or your loved ones about your wishes): No, advance care planning information given to patient to review.  Advanced care planning was  discussed at today's visit.    Social History     Tobacco Use     Smoking status: Never Smoker     Smokeless tobacco: Never Used   Substance Use Topics     Alcohol use: Yes     Alcohol/week: 0.0 standard drinks     Comment: not at all     If you drink alcohol do you typically have >3 drinks per day or >7 drinks per week? No                     Reviewed orders with patient.  Reviewed health maintenance and updated orders accordingly - Yes  Lab work is in process  Labs reviewed in EPIC  BP Readings from Last 3 Encounters:   05/19/21 (!) 159/95   05/26/20 138/88   05/14/20 132/88    Wt Readings from Last 3 Encounters:   05/19/21 87.5 kg (193 lb)   05/26/20 89.2 kg (196 lb 9.6 oz)   05/14/20 87.5 kg (193 lb)                  Patient Active Problem List   Diagnosis     Hepatitis B infection     Anxiety     H/O: hysterectomy     Health Care Home     Advanced directives, counseling/discussion     Hypothyroidism     Benign essential hypertension     Hypercholesteremia     Past Surgical History:   Procedure Laterality Date     TABSO  02/09/2007    total abdominal hysterectomy and BSO, cervix has been removed       Social History     Tobacco Use     Smoking status: Never Smoker     Smokeless tobacco: Never Used   Substance Use Topics     Alcohol use: Yes     Alcohol/week: 0.0 standard drinks     Comment: not at all     No family history on file.      Current Outpatient Medications   Medication Sig Dispense Refill     acetaminophen (TYLENOL) 500 MG tablet Take 1-2 tablets by mouth every 6 hours as needed.       amLODIPine (NORVASC) 5 MG tablet TAKE 1 TABLET(5 MG) BY MOUTH DAILY 90 tablet 3     ascorbic acid (VITAMIN C) 500 MG tablet Take 1,000 mg by mouth daily        ASPIRIN PO Take 81 mg by mouth once a week        CALCIUM PO        cholecalciferol (VITAMIN D3) 125 MCG (5000 UT) TABS tablet Take 10,000 Units by mouth daily       Coenzyme Q10 (CO Q 10) 100 MG CAPS Take 100 mg by mouth daily 90 capsule 3     COMPOUNDED  NON-CONTROLLED SUBSTANCE (CMPD RX) - PHARMACY TO MIX COMPOUNDED MEDICATION Estradiol 0.02% cream HRT base 30 g 3     Cyanocobalamin (VITAMIN B 12 PO) Take 1 tablet by mouth every other day       estradiol (CLIMARA) 0.0375 MG/24HR weekly patch UNWRAP AND APPLY 1 PATCH TO SKIN EVERY WEEK 12 patch 0     Flaxseed, Linseed, (FLAX SEED OIL) 1000 MG capsule Take 1 capsule (1,000 mg) by mouth daily 100 capsule 1     Ginger, Zingiber officinalis, (GINGER PO)        glucosamine-chondroitin 500-400 MG CAPS Take 1 capsule by mouth daily       levothyroxine (SYNTHROID/LEVOTHROID) 75 MCG tablet Take 1 tablet (75 mcg) by mouth daily 90 tablet 1     MAGNESIUM PO        Multiple Vitamins-Minerals (ZINC PO)        simvastatin (ZOCOR) 20 MG tablet Take 1 tablet (20 mg) by mouth daily 90 tablet 3     Soap & Cleansers (ALOEVERA EX)        Turmeric 500 MG CAPS Take 500 mg by mouth       vitamin B complex with vitamin C (VITAMIN  B COMPLEX) tablet Take 1 tablet by mouth daily       No Known Allergies  Recent Labs   Lab Test 05/05/20  0830 12/31/18  1007 12/29/17  0900 12/29/17  0859   A1C 6.1* 5.6 5.6  --    LDL 93 105*  --  142*   HDL 47 51  --  61   TRIG 208* 237*  --  194*   ALT 28 24  --  25   CR 0.76 0.58  --  0.84   POTASSIUM 4.2 4.5  --  4.5        FSH-7: No flowsheet data found.  click delete button to remove this line now  Mammogram Screening: Recommended mammography every 1-2 years with patient discussion and risk factor consideration  Pertinent mammograms are reviewed under the imaging tab.    Pertinent mammograms are reviewed under the imaging tab.  History of abnormal Pap smear: Status post benign hysterectomy. Health Maintenance and Surgical History updated. Confirmed with Monica that her cervix has been removed.     Reviewed and updated as needed this visit by clinical staff                 Reviewed and updated as needed this visit by Provider  Tobacco  Allergies  Meds  Problems  Med Hx  Surg Hx  Fam Hx         Past  "Medical History:   Diagnosis Date     Anxiety      Dyslipidemia      H/O: hysterectomy      Hepatitis B      HTN (hypertension)      Hypothyroidism      Osteoarthritis cervical spine     knees      Past Surgical History:   Procedure Laterality Date     TABSO  2007    total abdominal hysterectomy and BSO, cervix has been removed     OB History    Para Term  AB Living   5 4 0 0 1 4   SAB TAB Ectopic Multiple Live Births   0 0 0 0 0      # Outcome Date GA Lbr Syed/2nd Weight Sex Delivery Anes PTL Lv   5 AB            4 Para            3 Para            2 Para            1 Para                ROS:  Hearing Screening:    Left Ear-  500Hz: Pass  1000Hz: Pass  2000Hz: Pass  4000Hz: Pass    Right Ear-  500Hz: Pass  1000Hz: Pass  2000Hz: Pass  4000Hz: Pass    Gen, HEENT, breast, CV, resp, GI, , MSK, heme/lymph, endo, skin, neuro, psych negative except as listed per HPI    OBJECTIVE:   Physical Exam:    /82   Pulse 69   Ht 1.588 m (5' 2.5\")   Wt 87.5 kg (193 lb)   SpO2 98%   BMI 34.74 kg/m      CONSTITUTIONAL: Alert non-toxic appearing female in no acute distress  HEAD: Normocephalic, atraumatic  EYES: PERRLA; no scleral icterus; sclera without injection  ENT: EACs clear bilaterally, TMs pearly gray and intact with good visualization of bony landmarks and cone of light, no bulging or erythema; nares patent without ulceration or edema; oropharynx pink without lesions; posterior pharynx without exudates  NECK: Neck supple without lymphadenopathy, thyroid without enlargement or nodularity  BREAST: Declined after shared decision making  RESPIRATORY: Lungs clear to auscultation, respirations unlabored  CV: Regular rate and rhythm, S1S2, no clicks, murmurs, rubs, or gallops; bilateral lower extremities without edema, posterior tibialis pulses 2+ bilaterally  GASTROINTESTINAL: Normoactive bowel sounds x4 quadrants, abdomen soft, non-distended, and non-tender to palpation without masses or " organomegaly  MUSCULOSKELETAL: Moves all extremities, no obvious muscle wasting  NEUROLOGIC: No gross deficits, normal gait  SKIN: Appropriate color for race, warm and dry, no suspicious lesions or rashes  PSYCHIATRIC: Pleasant and interactive, affect euthymic, makes appropriate eye contact, thought process logical        Diagnostic Test Results:  No results found for this or any previous visit (from the past 24 hour(s)).    ASSESSMENT/PLAN:   Monica was seen today for physical and hearing screening.    Diagnoses and all orders for this visit:    Routine general medical examination at a health care facility  -     VENOUS COLLECTION    Well appearing 66 year old female. See below for counseling. Up to date on mammogram and colonoscopy. Pap no longer indicated. Due for DEXA, states she will have this done next year with her mammogram due to concerns about leaving home with COVID.    Benign essential hypertension  -     MT ART PRESS WAVEFORM ANALYS CENTRAL ART PRESSURE  -     Comp. Metabolic Panel (14) ([facility])  -     amLODIPine (NORVASC) 10 MG tablet; TAKE 1 TABLET(10 MG) BY MOUTH DAILY  -     VENOUS COLLECTION    BP slightly above goal, prefer closer to 120s/70s. Increase amlodipine to 10mg daily and recheck in 1-2 months with me. Reviewed lifestyle changes.    Acquired hypothyroidism  -     TSH ([facility])  -     VENOUS COLLECTION    Appears to be clinically euthyroid. Check TSH today and adjust levothyroxine as needed to maintain goal TSH of 0.8-3. If TSH outside goal, will adjust levothyroxine dosing and recheck TSH in 6-8 weeks. If within goal range, recheck in one year. Reviewed s/sxs hypothyroidism and thyrotoxicosis, when to seek further care.    Elevated cholesterol  -     Lipid Panel ([facility])  -     Comp. Metabolic Panel (14) ([facility])  -     VENOUS COLLECTION    Tolerating simvastatin, due for recheck. Reviewed lifestyle changes.    Elevated hemoglobin A1c  -     Hemoglobin A1C ([facility])  -     VENOUS  "COLLECTION    Prediabetic range last year, to work on healthy diet, exercise, weight loss    Screening for HIV (human immunodeficiency virus)  -     HIV 1/0/2 Rflx ([facility])  -     VENOUS COLLECTION    Osteopenia of multiple sites  -     DEXA - Hip/Pelvis/Spine (FUTURE/[facility]); Future  -     VENOUS COLLECTION    Repeat DEXA, work on calcium, vitamin D, weight bearing exercise    Advanced directives, counseling/discussion    Given information and forms to fill out    Class 1 obesity due to excess calories with serious comorbidity and body mass index (BMI) of 34.0 to 34.9 in adult    Exercise, healthy diet recommended    Vasomotor symptoms due to menopause    Reviewed risks associated with estrogen therapy, including PE. She is open to trialing off of estrogen patch. To update me if symptoms are not tolerable without Climara and if she would like to restart this.    Patient has been advised of split billing requirements and indicates understanding: Yes  COUNSELING:   Reviewed preventive health counseling, as reflected in patient instructions  Special attention given to:        Regular exercise       Healthy diet/nutrition       Vision screening       Immunizations    Up to date           Osteoporosis prevention/bone health       Colon cancer screening       HIV screeninx in teen years, 1x in adult years, and at intervals if high risk       (Maggy)menopause management       The 10-year ASCVD risk score (Chuck KAPIL Jr., et al., 2013) is: 7.3%    Values used to calculate the score:      Age: 66 years      Sex: Female      Is Non- : No      Diabetic: No      Tobacco smoker: No      Systolic Blood Pressure: 132 mmHg      Is BP treated: Yes      HDL Cholesterol: 47 mg/dL      Total Cholesterol: 182 mg/dL       Advance Care Planning    Estimated body mass index is 34.83 kg/m  as calculated from the following:    Height as of 20: 1.6 m (5' 3\").    Weight as of 20: 89.2 kg (196 lb 9.6 " oz).    Weight management plan: Discussed healthy diet and exercise guidelines    She reports that she has never smoked. She has never used smokeless tobacco.      Counseling Resources:  ATP IV Guidelines  Pooled Cohorts Equation Calculator  Breast Cancer Risk Calculator  BRCA-Related Cancer Risk Assessment: FHS-7 Tool  FRAX Risk Assessment  ICSI Preventive Guidelines  Dietary Guidelines for Americans, 2010  USDA's MyPlate  ASA Prophylaxis  Lung CA Screening    CAMRYN Mendes CNP  Former[facility]

## 2021-05-20 LAB
ALBUMIN SERPL-MCNC: 4.5 G/DL (ref 3.8–4.8)
ALBUMIN/GLOB SERPL: 1.7 {RATIO} (ref 1.2–2.2)
ALP SERPL-CCNC: 100 IU/L (ref 48–121)
ALT SERPL-CCNC: 32 IU/L (ref 0–32)
AST SERPL-CCNC: 23 IU/L (ref 0–40)
BILIRUB SERPL-MCNC: 0.5 MG/DL (ref 0–1.2)
BUN SERPL-MCNC: 8 MG/DL (ref 8–27)
BUN/CREATININE RATIO: 12 (ref 12–28)
CALCIUM SERPL-MCNC: 9.4 MG/DL (ref 8.7–10.3)
CHLORIDE SERPLBLD-SCNC: 104 MMOL/L (ref 96–106)
CHOLEST SERPL-MCNC: 179 MG/DL (ref 100–199)
CREAT SERPL-MCNC: 0.67 MG/DL (ref 0.57–1)
EGFR IF AFRICN AM: 107 ML/MIN/1.73
EGFR IF NONAFRICN AM: 93 ML/MIN/1.73
GLOBULIN, TOTAL: 2.6 G/DL (ref 1.5–4.5)
GLUCOSE SERPL-MCNC: 108 MG/DL (ref 65–99)
HBA1C MFR BLD: 6.1 % (ref 4.8–5.6)
HDLC SERPL-MCNC: 45 MG/DL
HIV SCREEN 4TH GEN WITH RFLX: NON REACTIVE
LDL/HDL RATIO: 2.4 RATIO (ref 0–3.2)
LDLC SERPL CALC-MCNC: 110 MG/DL (ref 0–99)
POTASSIUM SERPL-SCNC: 4.1 MMOL/L (ref 3.5–5.2)
PROT SERPL-MCNC: 7.1 G/DL (ref 6–8.5)
SODIUM SERPL-SCNC: 140 MMOL/L (ref 134–144)
TOTAL CO2: 23 MMOL/L (ref 20–29)
TRIGL SERPL-MCNC: 134 MG/DL (ref 0–149)
TSH BLD-ACNC: 3.28 UIU/ML (ref 0.45–4.5)
VLDLC SERPL CALC-MCNC: 24 MG/DL (ref 5–40)

## 2021-05-20 ASSESSMENT — ANXIETY QUESTIONNAIRES: GAD7 TOTAL SCORE: 2

## 2021-06-01 ENCOUNTER — RECORDS - HEALTHEAST (OUTPATIENT)
Dept: ADMINISTRATIVE | Facility: CLINIC | Age: 66
End: 2021-06-01

## 2021-07-30 DIAGNOSIS — N95.1 MENOPAUSAL SYNDROME (HOT FLASHES): Primary | ICD-10-CM

## 2021-07-30 RX ORDER — ESTRADIOL 0.04 MG/D
PATCH TRANSDERMAL
Qty: 12 PATCH | Refills: 1 | Status: SHIPPED | OUTPATIENT
Start: 2021-07-30 | End: 2021-11-15

## 2021-10-24 ENCOUNTER — HEALTH MAINTENANCE LETTER (OUTPATIENT)
Age: 66
End: 2021-10-24

## 2021-10-26 DIAGNOSIS — Z23 NEEDS FLU SHOT: Primary | ICD-10-CM

## 2021-10-26 PROCEDURE — 90686 IIV4 VACC NO PRSV 0.5 ML IM: CPT | Performed by: NURSE PRACTITIONER

## 2021-10-26 PROCEDURE — G0008 ADMIN INFLUENZA VIRUS VAC: HCPCS | Performed by: NURSE PRACTITIONER

## 2021-11-08 DIAGNOSIS — E03.9 ACQUIRED HYPOTHYROIDISM: ICD-10-CM

## 2021-11-08 NOTE — TELEPHONE ENCOUNTER
Levothyroxine. Last addressed 5/19/21.        5 mo ago   (5/19/21) 1 yr ago   (6/29/20) 1 yr ago   (5/5/20) 2 yr ago   (12/31/18) 3 yr ago   (12/29/17) 4 yr ago   (12/28/16) 5 yr ago   (12/22/15)      TSH 0.450 - 4.500 uIU/mL 3.280  4.360  6.850 High   6.450 High   6.370 High   5.770 High   5.900 High             Acquired hypothyroidism  -     TSH ([facility])  -     VENOUS COLLECTION     Appears to be clinically euthyroid. Check TSH today and adjust levothyroxine as needed to maintain goal TSH of 0.8-3. If TSH outside goal, will adjust levothyroxine dosing and recheck TSH in 6-8 weeks. If within goal range, recheck in one year. Reviewed s/sxs hypothyroidism and thyrotoxicosis, when to seek further care.

## 2021-11-09 RX ORDER — LEVOTHYROXINE SODIUM 75 UG/1
75 TABLET ORAL DAILY
Qty: 90 TABLET | Refills: 1 | Status: SHIPPED | OUTPATIENT
Start: 2021-11-09 | End: 2022-05-09

## 2021-11-15 DIAGNOSIS — N95.1 MENOPAUSAL SYNDROME (HOT FLASHES): ICD-10-CM

## 2021-11-15 RX ORDER — ESTRADIOL 0.04 MG/D
PATCH TRANSDERMAL
Qty: 12 PATCH | Refills: 1 | Status: SHIPPED | OUTPATIENT
Start: 2021-11-15 | End: 2022-05-23

## 2022-03-17 ENCOUNTER — HOSPITAL ENCOUNTER (OUTPATIENT)
Dept: MAMMOGRAPHY | Facility: CLINIC | Age: 67
Discharge: HOME | End: 2022-03-17
Attending: NURSE PRACTITIONER | Admitting: NURSE PRACTITIONER
Payer: COMMERCIAL

## 2022-03-17 DIAGNOSIS — Z12.31 VISIT FOR SCREENING MAMMOGRAM: ICD-10-CM

## 2022-03-17 PROCEDURE — 77067 SCR MAMMO BI INCL CAD: CPT

## 2022-05-20 NOTE — PATIENT INSTRUCTIONS
Patient Education   Personalized Prevention Plan  You are due for the preventive services outlined below.  Your care team is available to assist you in scheduling these services.  If you have already completed any of these items, please share that information with your care team to update in your medical record.  Health Maintenance Due   Topic Date Due    PHQ-2 (once per calendar year)  01/01/2022    FALL RISK ASSESSMENT  02/08/2022    COVID-19 Vaccine (4 - Booster for Moderna series) 04/23/2022    Pneumococcal Vaccine (1 - PCV) 02/08/2022          Bone density test  Let me know if you don't tolerate going off the Climara  Trial of HyaloGyn (over the counter vaginal moisturizer)- if this is not helpful, we can try the estradiol cream again  You can use coconut oil to the vulva   a pill splitter at the pharmacy and take half the amlodipine until this bottle runs out- when you get the new bottle you'll take the full dose

## 2022-05-20 NOTE — PROGRESS NOTES
"  SUBJECTIVE:   Monica Bryson is a 67 year old female who presents for Preventive Visit.    Patient has been advised of split billing requirements and indicates understanding: Yes  Are you in the first 12 months of your Medicare Part B coverage?  No    Hypothyroidism: On levothyroxine 75mcg. Taking daily without incident. Denies s/sxs hypothyroidism or thyrotoxicosis.  HTN: On amlodipine 5mg daily- recently received 10mg daily from the pharmacy and questioning why. Runs about 130s/80s at home. No chest pain or pressure, SOB/DOMINGO, peripheral edema, or neurologic changes.  Vasomotor symptoms of menopause: Has been on Climara patch for several years. Trialing off this month but will update me if symptoms become unbearable or impact quality of life.  HLD: On simvastatin 20mg daily. Denies myalgias or exertional symptoms.  Obesity: Doing a stationary bike, walks around the park as well- [place]. Trying to eat less rice. Eating lots of fruits, salad, green leafy vegetables.  Wt Readings from Last 5 Encounters:   05/23/22 86.5 kg (190 lb 9.6 oz)   05/19/21 87.5 kg (193 lb)   05/26/20 89.2 kg (196 lb 9.6 oz)   05/14/20 87.5 kg (193 lb)   09/11/19 85.3 kg (188 lb)         Physical Health:    In general, how would you rate your overall physical health? fair    Outside of work, how many days during the week do you exercise? 2-3 days/week    Outside of work, approximately how many minutes a day do you exercise?15-30 minutes    If you drink alcohol do you typically have >3 drinks per day or >7 drinks per week? No    Do you usually eat at least 4 servings of fruit and vegetables a day, include whole grains & fiber and avoid regularly eating high fat or \"junk\" foods? Yes    Do you have any problems taking medications regularly?  No    Do you have any side effects from medications? none    Needs assistance for the following daily activities: no assistance needed    Which of the following safety concerns are present in " your home?  none identified     Hearing impairment: No    In the past 6 months, have you been bothered by leaking of urine? no    Hearing Screening:  Right Ear  4000Hz: pass  2000Hz: pass  1000Hz: pass  500Hz: pass    Left Ear  4000Hz: pass  2000Hz: pass  1000Hz: pass  500Hz: pass    Mental Health:    In general, how would you rate your overall mental or emotional health? good  PHQ-2 Score:   PHQ-2 (  [employer]) 2022   Q1: Little interest or pleasure in doing things 0 1   Q2: Feeling down, depressed or hopeless 0 1   PHQ-2 Score 0 2   PHQ-2 Total Score (12-17 Years)- Positive if 3 or more points; Administer PHQ-A if positive - 2       Do you feel safe in your environment? YES    Have you ever done Advance Care Planning? (For example, a Health Directive, POLST, or a discussion with a medical provider or your loved ones about your wishes): No, advance care planning information given to patient to review.  Patient plans to discuss their wishes with loved ones or provider.      Additional concerns to address?  No    Fall risk:  Fallen 2 or more times in the past year?: No  Any fall with injury in the past year?: No    Cognitive Screenin) Repeat 3 items (Leader, Season, Table)    2) Clock draw: NORMAL  3) 3 item recall: Recalls 1 object   Results: NORMAL clock, 1-2 items recalled: COGNITIVE IMPAIRMENT LESS LIKELY  Mini-CogTM Copyright SORAYA Mosqueda. Licensed by the author for use in [facility]; reprinted with permission ([email or web address]). All rights reserved.      Do you have sleep apnea, excessive snoring or daytime drowsiness?: no          Reviewed and updated as needed this visit by clinical staff   Tobacco  Allergies  Meds  Problems  Med Hx  Surg Hx  Fam Hx            Reviewed and updated as needed this visit by Provider   Tobacco  Allergies  Meds  Problems  Med Hx  Surg Hx  Fam Hx           Social History     Tobacco Use     Smoking status: Never Smoker     Smokeless tobacco:  Never Used   Substance Use Topics     Alcohol use: Yes     Alcohol/week: 0.0 standard drinks     Comment: not at all                           Current providers sharing in care for this patient include:   Patient Care Team:  Sharri Gonzalez APRN CNP as PCP - General (Nurse Practitioner)  Sharri Gonzalez APRN CNP as Assigned PCP    The following health maintenance items are reviewed in Epic and correct as of today:  Health Maintenance   Topic Date Due     COVID-19 Vaccine (4 - Booster for Moderna series) 04/23/2022     Pneumococcal Vaccine: 65+ Years (1 - PCV) 02/08/2022     MEDICARE ANNUAL WELLNESS VISIT  05/23/2023     FALL RISK ASSESSMENT  05/23/2023     MAMMO SCREENING  03/17/2024     LIPID  05/19/2026     ADVANCE CARE PLANNING  05/23/2027     DEXA  11/26/2027     COLORECTAL CANCER SCREENING  12/16/2027     DTAP/TDAP/TD IMMUNIZATION (4 - Td or Tdap) 09/03/2029     HEPATITIS C SCREENING  Completed     HIV SCREENING  Completed     PHQ-2 (once per calendar year)  Completed     INFLUENZA VACCINE  Completed     ZOSTER IMMUNIZATION  Completed     Pneumococcal Vaccine: Pediatrics (0 to 5 Years) and At-Risk Patients (6 to 64 Years)  Aged Out     IPV IMMUNIZATION  Aged Out     MENINGITIS IMMUNIZATION  Aged Out     Lab work is in process  Labs reviewed in EPIC  BP Readings from Last 3 Encounters:   05/23/22 136/86   05/19/21 132/82   05/26/20 138/88    Wt Readings from Last 3 Encounters:   05/23/22 86.5 kg (190 lb 9.6 oz)   05/19/21 87.5 kg (193 lb)   05/26/20 89.2 kg (196 lb 9.6 oz)                  Patient Active Problem List   Diagnosis     Hepatitis B infection     Anxiety     Vasomotor symptoms due to menopause     Hypothyroidism     Benign essential hypertension     Elevated cholesterol     Class 1 obesity due to excess calories with serious comorbidity and body mass index (BMI) of 33.0 to 33.9 in adult     Elevated hemoglobin A1c     Osteopenia of multiple sites     Past Surgical History:   Procedure  Laterality Date     TABSO  02/09/2007    total abdominal hysterectomy and BSO, cervix has been removed       Social History     Tobacco Use     Smoking status: Never Smoker     Smokeless tobacco: Never Used   Substance Use Topics     Alcohol use: Yes     Alcohol/week: 0.0 standard drinks     Comment: not at all     Family History   Problem Relation Age of Onset     Liver Disease Mother      Muscular Disorder Father      No Known Problems Brother      Fibroids Daughter          Current Outpatient Medications   Medication Sig Dispense Refill     acetaminophen (TYLENOL) 500 MG tablet Take 1-2 tablets by mouth every 6 hours as needed.       amLODIPine (NORVASC) 5 MG tablet Take 1 tablet (5 mg) by mouth daily 90 tablet 3     ascorbic acid (VITAMIN C) 500 MG tablet Take 1,000 mg by mouth daily        CALCIUM PO        cholecalciferol (VITAMIN D3) 125 MCG (5000 UT) TABS tablet Take 10,000 Units by mouth daily       Coenzyme Q10 (CO Q 10) 100 MG CAPS Take 100 mg by mouth daily 90 capsule 3     Cyanocobalamin (VITAMIN B 12 PO) Take 1 tablet by mouth every other day       Flaxseed, Linseed, (FLAX SEED OIL) 1000 MG capsule Take 1 capsule (1,000 mg) by mouth daily 100 capsule 1     Ginger, Zingiber officinalis, (GINGER PO)        glucosamine-chondroitin 500-400 MG CAPS Take 1 capsule by mouth daily       levothyroxine (SYNTHROID/LEVOTHROID) 75 MCG tablet TAKE 1 TABLET (75 MCG) BY MOUTH DAILY 90 tablet 3     MAGNESIUM PO        Multiple Vitamins-Minerals (ZINC PO)        simvastatin (ZOCOR) 20 MG tablet TAKE 1 TABLET BY MOUTH EVERY DAY 90 tablet 3     Soap & Cleansers (ALOEVERA EX)        Turmeric 500 MG CAPS Take 500 mg by mouth       vitamin B complex with vitamin C (STRESS TAB) tablet Take 1 tablet by mouth daily       ASPIRIN PO Take 81 mg by mouth once a week  (Patient not taking: Reported on 5/23/2022)       No Known Allergies  Recent Labs   Lab Test 05/19/21  0906 05/19/21  0905 05/05/20  0830 12/31/18  1007   A1C 6.1*   "--  6.1* 5.6   LDL  --  110* 93 105*   HDL  --  45 47 51   TRIG  --  134 208* 237*   ALT  --  32 28 24   CR  --  0.67 0.76 0.58   POTASSIUM  --  4.1 4.2 4.5        Last Pap/HPV: S/p benign hysterectomy, no longer indicated  Last mammogram: 3/2022, BiRADS 1  Last DEXA: 2012, taking calcium and vitamin D  Last colonoscopy: 12/2020, due 12/2027- one tubular adenoma      ROS:  Gen, HEENT, breast, CV, resp, GI, , MSK, heme/lymph, endo, skin, neuro, psych negative except as listed per HPI      OBJECTIVE:   Physical Exam:    /86   Pulse 77   Temp 97.7  F (36.5  C)   Ht 1.6 m (5' 3\")   Wt 86.5 kg (190 lb 9.6 oz)   SpO2 99%   BMI 33.76 kg/m      CONSTITUTIONAL: Alert non-toxic appearing female in no acute distress  HEAD: Normocephalic, atraumatic  EYES: PERRLA; no scleral icterus; sclera without injection  ENT: EACs clear bilaterally, TMs pearly gray and intact with good visualization of bony landmarks and cone of light, no bulging or erythema; nares patent without ulceration or edema; oropharynx pink without lesions; posterior pharynx without exudates  NECK: Neck supple without lymphadenopathy, thyroid without enlargement or nodularity  BREAST: Not performed after shared clinical decision making and discussion of USPSTF recommendations  RESPIRATORY: Lungs clear to auscultation, respirations unlabored  CV: Regular rate and rhythm, S1S2, no clicks, murmurs, rubs, or gallops; bilateral lower extremities without edema, posterior tibialis pulses 2+ bilaterally  GASTROINTESTINAL: Normoactive bowel sounds x4 quadrants, abdomen soft, non-distended, and non-tender to palpation without masses or organomegaly  MUSCULOSKELETAL: Moves all extremities, no obvious muscle wasting  NEUROLOGIC: No gross deficits, normal gait  SKIN: Appropriate color for race, warm and dry, no suspicious lesions or rashes  PSYCHIATRIC: Pleasant and interactive, affect euthymic, makes appropriate eye contact, thought process " logical        Diagnostic Test Results:  No results found for this or any previous visit (from the past 24 hour(s)).    ASSESSMENT / PLAN:   Monica was seen today for physical, thyroid problem and hearing screening.    Diagnoses and all orders for this visit:    Encounter for Medicare annual wellness exam    Well appearing 67 year old female. See below for counseling. Recommend COVID19 booster.    Need for pneumococcal vaccination  -     PNEUMOCOCCAL 20 VALENT CONJUGATE (PREVNAR 20)  -     PNEUMOCOCCAL VACCINE ADMIN    Elevated cholesterol  -     Lipid Profile ([facility])    On simvastatin 20mg daily, continue current plan.    Acquired hypothyroidism  -     TSH ([facility])    Appears to be clinically euthyroid. Check TSH today and adjust levothyroxine as needed to maintain goal TSH of 0.8-3. If TSH outside goal, will adjust levothyroxine dosing and recheck TSH in 6-8 weeks. If within goal range, recheck in one year. Reviewed s/sxs hypothyroidism and thyrotoxicosis, when to seek further care.    Benign essential hypertension  -     Basic Metabolic Panel (8) ([facility])  -     amLODIPine (NORVASC) 5 MG tablet; Take 1 tablet (5 mg) by mouth daily    Adequately controlled with 5mg daily- change back to her previous dosing. Work on healthy diet and exercise.     Elevated hemoglobin A1c  -     Hemoglobin A1C ([facility])    Reviewed prediabetes and recommendations for exercise, diet, and weight loss of 7%. Consider metformin.    Osteopenia of multiple sites  -     DEXA - Hip/Pelvis/Spine (FUTURE/[facility]); Future    Continue calcium, vit D, weight bearing exercise.    Class 1 obesity due to excess calories with serious comorbidity and body mass index (BMI) of 33.0 to 33.9 in adult    Likely impacting HTN, HLD, and elevated A1C. Recommend weight loss, healthy diet, exercise 150 minutes per week.    Vasomotor symptoms due to menopause    Trialing off Climara- to update me if this is intolerable. Recommend trial of HyaloGyn for  "vaginal dryness, if not effective, consider vaginal estradiol cream.      Patient has been advised of split billing requirements and indicates understanding: Yes    COUNSELING:  Reviewed preventive health counseling, as reflected in patient instructions  Special attention given to:       Regular exercise       Healthy diet/nutrition       Dental care       Immunizations    Vaccinated for: Pneumococcal             Osteoporosis prevention/bone health       Colon cancer screening       (Maggy)menopause management    Estimated body mass index is 33.76 kg/m  as calculated from the following:    Height as of this encounter: 1.6 m (5' 3\").    Weight as of this encounter: 86.5 kg (190 lb 9.6 oz).    Weight management plan: Discussed healthy diet and exercise guidelines    She reports that she has never smoked. She has never used smokeless tobacco.    Appropriate preventive services were discussed with this patient, including applicable screening as appropriate for cardiovascular disease, diabetes, osteopenia/osteoporosis, and glaucoma.  As appropriate for age/gender, discussed screening for colorectal cancer, prostate cancer, breast cancer, and cervical cancer. Checklist reviewing preventive services available has been given to the patient.    Reviewed patients plan of care and provided an AVS. The Basic Care Plan (routine screening as documented in Health Maintenance) for Monica meets the Care Plan requirement. This Care Plan has been established and reviewed with the Patient.    Counseling Resources:  ATP IV Guidelines  Pooled Cohorts Equation Calculator  Breast Cancer Risk Calculator  BRCA-Related Cancer Risk Assessment: FHS-7 Tool  FRAX Risk Assessment  ICSI Preventive Guidelines  Dietary Guidelines for Americans, 2010  USDA's MyPlate  ASA Prophylaxis  Lung CA Screening    CAMRYN Mendes CNP  [facility]  "

## 2022-05-23 ENCOUNTER — OFFICE VISIT (OUTPATIENT)
Dept: FAMILY MEDICINE | Facility: CLINIC | Age: 67
End: 2022-05-23

## 2022-05-23 VITALS
OXYGEN SATURATION: 99 % | DIASTOLIC BLOOD PRESSURE: 86 MMHG | BODY MASS INDEX: 33.77 KG/M2 | SYSTOLIC BLOOD PRESSURE: 136 MMHG | TEMPERATURE: 97.7 F | HEART RATE: 77 BPM | HEIGHT: 63 IN | WEIGHT: 190.6 LBS

## 2022-05-23 DIAGNOSIS — N95.1 VASOMOTOR SYMPTOMS DUE TO MENOPAUSE: ICD-10-CM

## 2022-05-23 DIAGNOSIS — E78.00 ELEVATED CHOLESTEROL: ICD-10-CM

## 2022-05-23 DIAGNOSIS — Z23 NEED FOR PNEUMOCOCCAL VACCINATION: ICD-10-CM

## 2022-05-23 DIAGNOSIS — E66.09 CLASS 1 OBESITY DUE TO EXCESS CALORIES WITH SERIOUS COMORBIDITY AND BODY MASS INDEX (BMI) OF 33.0 TO 33.9 IN ADULT: ICD-10-CM

## 2022-05-23 DIAGNOSIS — M85.89 OSTEOPENIA OF MULTIPLE SITES: ICD-10-CM

## 2022-05-23 DIAGNOSIS — E66.811 CLASS 1 OBESITY DUE TO EXCESS CALORIES WITH SERIOUS COMORBIDITY AND BODY MASS INDEX (BMI) OF 33.0 TO 33.9 IN ADULT: ICD-10-CM

## 2022-05-23 DIAGNOSIS — I10 BENIGN ESSENTIAL HYPERTENSION: ICD-10-CM

## 2022-05-23 DIAGNOSIS — R73.09 ELEVATED HEMOGLOBIN A1C: ICD-10-CM

## 2022-05-23 DIAGNOSIS — Z00.00 ENCOUNTER FOR MEDICARE ANNUAL WELLNESS EXAM: Primary | ICD-10-CM

## 2022-05-23 DIAGNOSIS — E03.9 ACQUIRED HYPOTHYROIDISM: ICD-10-CM

## 2022-05-23 LAB
CHOL/HDL RATIO (RMG): 4.1 MG/DL (ref 0–4.5)
CHOLESTEROL: 180 MG/DL (ref 100–199)
HDL (RMG): 44 MG/DL (ref 40–?)
LDL CALCULATED (RMG): 103 MG/DL (ref 0–130)
TRIGLYCERIDES (RMG): 167 MG/DL (ref 0–149)

## 2022-05-23 PROCEDURE — G0438 PPPS, INITIAL VISIT: HCPCS | Performed by: NURSE PRACTITIONER

## 2022-05-23 PROCEDURE — 99214 OFFICE O/P EST MOD 30 MIN: CPT | Mod: 25 | Performed by: NURSE PRACTITIONER

## 2022-05-23 PROCEDURE — 80061 LIPID PANEL: CPT | Mod: QW | Performed by: NURSE PRACTITIONER

## 2022-05-23 PROCEDURE — 36415 COLL VENOUS BLD VENIPUNCTURE: CPT | Performed by: NURSE PRACTITIONER

## 2022-05-23 PROCEDURE — 90677 PCV20 VACCINE IM: CPT | Performed by: NURSE PRACTITIONER

## 2022-05-23 PROCEDURE — G0009 ADMIN PNEUMOCOCCAL VACCINE: HCPCS | Mod: 59 | Performed by: NURSE PRACTITIONER

## 2022-05-23 RX ORDER — AMLODIPINE BESYLATE 5 MG/1
5 TABLET ORAL DAILY
Qty: 90 TABLET | Refills: 3 | Status: SHIPPED | OUTPATIENT
Start: 2022-05-23 | End: 2023-05-24

## 2022-05-24 LAB
BUN SERPL-MCNC: 6 MG/DL (ref 8–27)
BUN/CREATININE RATIO: 10 (ref 12–28)
CALCIUM SERPL-MCNC: 9.6 MG/DL (ref 8.7–10.3)
CHLORIDE SERPLBLD-SCNC: 103 MMOL/L (ref 96–106)
CREAT SERPL-MCNC: 0.58 MG/DL (ref 0.57–1)
EGFR: 100 ML/MIN/1.73
GLUCOSE SERPL-MCNC: 93 MG/DL (ref 65–99)
HBA1C MFR BLD: 5.9 % (ref 4.8–5.6)
POTASSIUM SERPL-SCNC: 4.2 MMOL/L (ref 3.5–5.2)
SODIUM SERPL-SCNC: 140 MMOL/L (ref 134–144)
TOTAL CO2: 23 MMOL/L (ref 20–29)
TSH BLD-ACNC: 3.4 UIU/ML (ref 0.45–4.5)

## 2022-06-10 ENCOUNTER — HOSPITAL ENCOUNTER (OUTPATIENT)
Dept: BONE DENSITY | Facility: CLINIC | Age: 67
Discharge: HOME | End: 2022-06-10
Attending: NURSE PRACTITIONER | Admitting: NURSE PRACTITIONER
Payer: COMMERCIAL

## 2022-06-10 DIAGNOSIS — M85.89 OSTEOPENIA OF MULTIPLE SITES: ICD-10-CM

## 2022-06-10 PROCEDURE — 77080 DXA BONE DENSITY AXIAL: CPT

## 2022-09-13 ENCOUNTER — OFFICE VISIT (OUTPATIENT)
Dept: FAMILY MEDICINE | Facility: CLINIC | Age: 67
End: 2022-09-13

## 2022-09-13 VITALS
TEMPERATURE: 97.5 F | RESPIRATION RATE: 16 BRPM | SYSTOLIC BLOOD PRESSURE: 124 MMHG | HEART RATE: 71 BPM | OXYGEN SATURATION: 98 % | BODY MASS INDEX: 32.56 KG/M2 | DIASTOLIC BLOOD PRESSURE: 76 MMHG | WEIGHT: 183.8 LBS

## 2022-09-13 DIAGNOSIS — Z23 NEEDS FLU SHOT: ICD-10-CM

## 2022-09-13 DIAGNOSIS — S93.401S INVERSION SPRAIN OF RIGHT ANKLE, SEQUELA: ICD-10-CM

## 2022-09-13 DIAGNOSIS — S82.61XS CLOSED AVULSION FRACTURE OF LATERAL MALLEOLUS OF RIGHT FIBULA, SEQUELA: ICD-10-CM

## 2022-09-13 DIAGNOSIS — M25.571 PAIN IN JOINT, ANKLE AND FOOT, RIGHT: Primary | ICD-10-CM

## 2022-09-13 PROCEDURE — 90694 VACC AIIV4 NO PRSRV 0.5ML IM: CPT | Performed by: FAMILY MEDICINE

## 2022-09-13 PROCEDURE — G0008 ADMIN INFLUENZA VIRUS VAC: HCPCS | Mod: 59 | Performed by: FAMILY MEDICINE

## 2022-09-13 PROCEDURE — 73610 X-RAY EXAM OF ANKLE: CPT | Mod: RT | Performed by: FAMILY MEDICINE

## 2022-09-13 PROCEDURE — 99213 OFFICE O/P EST LOW 20 MIN: CPT | Performed by: FAMILY MEDICINE

## 2022-09-13 RX ORDER — NAPROXEN 500 MG/1
500 TABLET ORAL
COMMUNITY
End: 2023-05-24

## 2022-09-13 NOTE — PROGRESS NOTES
SUBJECTIVE:    Monica Bryson, is a 67 year old female presenting for the below:     1. Ongoing right lateral ankle pain. Inversion injury 6 weeks ago. Has not presented for this to any medical care facility until now. Ongoing swelling and mild tenderness lateral ankle. Now with some associated medial knee pain.       OBJECTIVE:  Vitals:    09/13/22 0912   BP: 124/76   Pulse: 71   Resp: 16   Temp: 97.5  F (36.4  C)   SpO2: 98%   Weight: 83.4 kg (183 lb 12.8 oz)    Body mass index is 32.56 kg/m .    General: no acute distress, cooperative with exam.  Right ankle: mild tenderness over posterior aspect of lateral malleolus with mild localized swelling. Anterior draw test negative. ATFL feels intact / firm on stressing.   Right knee: mild tenderness to palpation over medial joint line. No effusion. Full range of motion.     Xray right knee: evidence of healing lateral malleolus avulsion fracture.       ASSESSMENT / PLAN:      Pain in joint, ankle and foot, right.   Closed avulsion fracture of lateral malleolus of right fibula, sequela  Inversion sprain of right ankle, sequela  -healing fracture 6 weeks out from occurrence. Stable exam. Non displaced on xray.  Immobilization at this time likely would not add much benefit.    -will proceed to PT for guided rehabilitation.    -     Physical Therapy Referral; Future  -     XR Ankle Right G/E 3 Views; Future    Needs flu shot  -     FLUAD QUADRIVALENT 65+ ([facility])

## 2023-03-20 ENCOUNTER — HOSPITAL ENCOUNTER (OUTPATIENT)
Dept: MAMMOGRAPHY | Facility: CLINIC | Age: 68
Discharge: HOME | End: 2023-03-20
Attending: FAMILY MEDICINE | Admitting: FAMILY MEDICINE
Payer: COMMERCIAL

## 2023-03-20 DIAGNOSIS — Z12.31 ENCOUNTER FOR SCREENING MAMMOGRAM FOR MALIGNANT NEOPLASM OF BREAST: ICD-10-CM

## 2023-03-20 PROCEDURE — 77067 SCR MAMMO BI INCL CAD: CPT

## 2023-03-29 DIAGNOSIS — E78.00 ELEVATED CHOLESTEROL: ICD-10-CM

## 2023-03-29 RX ORDER — SIMVASTATIN 20 MG
TABLET ORAL
Qty: 90 TABLET | Refills: 3 | Status: SHIPPED | OUTPATIENT
Start: 2023-03-29 | End: 2023-05-24

## 2023-05-01 DIAGNOSIS — E03.9 ACQUIRED HYPOTHYROIDISM: ICD-10-CM

## 2023-05-01 RX ORDER — LEVOTHYROXINE SODIUM 75 UG/1
TABLET ORAL
Qty: 90 TABLET | Refills: 3 | Status: SHIPPED | OUTPATIENT
Start: 2023-05-01 | End: 2023-05-24

## 2023-05-23 NOTE — PATIENT INSTRUCTIONS
Patient Education   Personalized Prevention Plan  You are due for the preventive services outlined below.  Your care team is available to assist you in scheduling these services.  If you have already completed any of these items, please share that information with your care team to update in your medical record.  Health Maintenance Due   Topic Date Due    PHQ-2 (once per calendar year)  01/01/2023    Thyroid Function Lab  05/23/2023    FALL RISK ASSESSMENT  05/23/2023    COVID-19 Vaccine (5 - Moderna series) 05/27/2023

## 2023-05-23 NOTE — PROGRESS NOTES
"SUBJECTIVE:   Monica rByson is a 68 year old female who presents for Preventive Visit.    Patient has been advised of split billing requirements and indicates understanding: Yes  Are you in the first 12 months of your Medicare Part B coverage?  No    Physical Health:    In general, how would you rate your overall physical health? fair    Outside of work, how many days during the week do you exercise? 2-3 days/week    Outside of work, approximately how many minutes a day do you exercise?15-30 minutes    If you drink alcohol do you typically have >3 drinks per day or >7 drinks per week? No    Do you usually eat at least 4 servings of fruit and vegetables a day, include whole grains & fiber and avoid regularly eating high fat or \"junk\" foods? Yes    Do you have any problems taking medications regularly?  No    Do you have any side effects from medications? yes    Needs assistance for the following daily activities: no assistance needed    Which of the following safety concerns are present in your home?  none identified     Hearing impairment: No    In the past 6 months, have you been bothered by leaking of urine? no    Hearing Screening:  Right Ear  4000Hz: pass  2000Hz: pass  1000Hz: pass  500Hz: pass    Left Ear  4000Hz: pass  2000Hz: pass  1000Hz: pass    500Hz: pass    Mental Health:    In general, how would you rate your overall mental or emotional health? good  PHQ-2 Score:  0    Do you feel safe in your environment? Yes    Have you ever done Advance Care Planning? (For example, a Health Directive, POLST, or a discussion with a medical provider or your loved ones about your wishes): No, advance care planning information given to patient to review.  Patient plans to discuss their wishes with loved ones or provider.      Fall risk:  Fallen 2 or more times in the past year?: Yes  Any fall with injury in the past year?: Yes    Cognitive Screenin) Repeat 3 items (Leader, Season, Table)    2) Clock " "draw: NORMAL  3) 3 item recall: Recalls 2 objects   Results: NORMAL clock, 1-2 items recalled: COGNITIVE IMPAIRMENT LESS LIKELY    Mini-CogTM Copyright S [employer]. Licensed by the author for use in [facility]; reprinted with permission ([email or web address]). All rights reserved.      Do you have sleep apnea, excessive snoring or daytime drowsiness?: no      OBJECTIVE:   /82   Pulse 65   Ht 1.588 m (5' 2.5\")   Wt 82.9 kg (182 lb 12.8 oz)   SpO2 99%   BMI 32.90 kg/m   Estimated body mass index is 32.9 kg/m  as calculated from the following:    Height as of this encounter: 1.588 m (5' 2.5\").    Weight as of this encounter: 82.9 kg (182 lb 12.8 oz).      ASSESSMENT / PLAN:     Encounter for Medicare annual wellness exam    COUNSELING:  Reviewed preventive health counseling, as reflected in patient instructions       Regular exercise       Healthy diet/nutrition       Hearing screening      "

## 2023-05-24 ENCOUNTER — OFFICE VISIT (OUTPATIENT)
Dept: FAMILY MEDICINE | Facility: CLINIC | Age: 68
End: 2023-05-24

## 2023-05-24 VITALS
SYSTOLIC BLOOD PRESSURE: 130 MMHG | DIASTOLIC BLOOD PRESSURE: 82 MMHG | OXYGEN SATURATION: 99 % | HEART RATE: 65 BPM | BODY MASS INDEX: 32.39 KG/M2 | WEIGHT: 182.8 LBS | HEIGHT: 63 IN

## 2023-05-24 DIAGNOSIS — R73.03 PRE-DIABETES: ICD-10-CM

## 2023-05-24 DIAGNOSIS — L83 ACANTHOSIS NIGRICANS: ICD-10-CM

## 2023-05-24 DIAGNOSIS — M25.562 CHRONIC PAIN OF LEFT KNEE: ICD-10-CM

## 2023-05-24 DIAGNOSIS — E78.00 ELEVATED CHOLESTEROL: ICD-10-CM

## 2023-05-24 DIAGNOSIS — Z00.00 ENCOUNTER FOR MEDICARE ANNUAL WELLNESS EXAM: Primary | ICD-10-CM

## 2023-05-24 DIAGNOSIS — I10 BENIGN ESSENTIAL HYPERTENSION: ICD-10-CM

## 2023-05-24 DIAGNOSIS — G89.29 CHRONIC PAIN OF LEFT KNEE: ICD-10-CM

## 2023-05-24 DIAGNOSIS — M79.671 RIGHT FOOT PAIN: ICD-10-CM

## 2023-05-24 DIAGNOSIS — E03.9 ACQUIRED HYPOTHYROIDISM: ICD-10-CM

## 2023-05-24 PROBLEM — R73.09 ELEVATED HEMOGLOBIN A1C: Status: RESOLVED | Noted: 2021-05-19 | Resolved: 2023-05-24

## 2023-05-24 LAB
CHOLESTEROL: 203 MG/DL (ref 100–199)
FASTING?: YES
HDL (RMG): 49 MG/DL (ref 40–?)
LDL CALCULATED (RMG): 134 MG/DL (ref 0–130)
TRIGLYCERIDES (RMG): 105 MG/DL (ref 0–149)

## 2023-05-24 PROCEDURE — G0439 PPPS, SUBSEQ VISIT: HCPCS | Performed by: FAMILY MEDICINE

## 2023-05-24 PROCEDURE — 73630 X-RAY EXAM OF FOOT: CPT | Mod: RT | Performed by: FAMILY MEDICINE

## 2023-05-24 PROCEDURE — 36415 COLL VENOUS BLD VENIPUNCTURE: CPT | Performed by: FAMILY MEDICINE

## 2023-05-24 PROCEDURE — 99214 OFFICE O/P EST MOD 30 MIN: CPT | Mod: 25 | Performed by: FAMILY MEDICINE

## 2023-05-24 PROCEDURE — 80061 LIPID PANEL: CPT | Mod: QW | Performed by: FAMILY MEDICINE

## 2023-05-24 RX ORDER — LEVOTHYROXINE SODIUM 75 UG/1
75 TABLET ORAL DAILY
Qty: 90 TABLET | Refills: 3 | Status: SHIPPED | OUTPATIENT
Start: 2023-05-24 | End: 2024-05-28 | Stop reason: DRUGHIGH

## 2023-05-24 RX ORDER — AMLODIPINE BESYLATE 5 MG/1
5 TABLET ORAL DAILY
Qty: 90 TABLET | Refills: 3 | Status: SHIPPED | OUTPATIENT
Start: 2023-05-24 | End: 2024-05-28

## 2023-05-24 RX ORDER — SIMVASTATIN 20 MG
20 TABLET ORAL DAILY
Qty: 90 TABLET | Refills: 3 | Status: SHIPPED | OUTPATIENT
Start: 2023-05-24 | End: 2024-05-28

## 2023-05-24 ASSESSMENT — ANXIETY QUESTIONNAIRES
1. FEELING NERVOUS, ANXIOUS, OR ON EDGE: NOT AT ALL
3. WORRYING TOO MUCH ABOUT DIFFERENT THINGS: NOT AT ALL
5. BEING SO RESTLESS THAT IT IS HARD TO SIT STILL: NOT AT ALL
GAD7 TOTAL SCORE: 2
6. BECOMING EASILY ANNOYED OR IRRITABLE: MORE THAN HALF THE DAYS
2. NOT BEING ABLE TO STOP OR CONTROL WORRYING: NOT AT ALL
GAD7 TOTAL SCORE: 2
7. FEELING AFRAID AS IF SOMETHING AWFUL MIGHT HAPPEN: NOT AT ALL

## 2023-05-24 ASSESSMENT — PATIENT HEALTH QUESTIONNAIRE - PHQ9
5. POOR APPETITE OR OVEREATING: NOT AT ALL
SUM OF ALL RESPONSES TO PHQ QUESTIONS 1-9: 7

## 2023-05-24 NOTE — PROGRESS NOTES
"SUBJECTIVE:    This 68 year old female presents with the following concerns:     1. Hypertension : Norvasc 5 mg daily. No ankle swelling. Walking most days.  2. Hypothyroidism : synthroid 75 mcg daily.   3. Hypercholesterolemia : simvastatin 20 mg daily.   4. Right foot, 2nd toe pain. Dull ache when walking. Last 6 months or so. No swelling. Does not recall trauma.   5. Left knee pain. Chronic. Interested in PT.       Health Maintenance:  Health maintenance alerts were reviewed and updated as appropriate.  Breast cancer screening:UTD  Osteoporosis screening: UTD : osteopenia with low FRAX score.   Colorectal cancer screening: UTD      OBJECTIVE:  Vitals:    05/24/23 0815 05/24/23 0848   BP: (!) 144/87 130/82   Pulse: 65    SpO2: 99%    Weight: 82.9 kg (182 lb 12.8 oz)    Height: 1.588 m (5' 2.5\")     Body mass index is 32.9 kg/m .  General: no acute distress, cooperative with exam.  HEENT:  PERRLA. Bilateral TM's, external canals, oropharynx normal.    Neck:  Supple, no lymphadenopathy or thyromegaly   Lungs: clear to auscultation bilaterally, normal respiratory effort.  Heart:  RRR without murmurs, rubs or gallops.  Normal S1 and S2.  Abdomen: normal bowel sounds, nontender, no palpable organomegaly.  Skin: Acanthosis nigricans to neck  Extremities: warm, perfused, no swelling or edema. Left knee with full ROM. No swelling or joint effusion noted.   Right foot : mild tenderness on palpation of 2nd metatarsal.   Neuro:  CN II-XII intact, motor & sensory function all intact.    Psych: mental status normal, mood and affect appropriate.    PHQ 9 : 7  DANE 7 : 2    Xray right foot : no fractures noted.         ASSESSMENT / PLAN:      Chronic pain of left knee  -     Physical Therapy Referral; Future    Benign essential hypertension  At target. Refills issued.   -     amLODIPine (NORVASC) 5 MG tablet; Take 1 tablet (5 mg) by mouth daily    BMI 32.0-32.9,adult  Acanthosis nigricans  -     Hemoglobin A1C " ([facility])    Acquired hypothyroidism  -     TSH ([facility])  -     levothyroxine (SYNTHROID/LEVOTHROID) 75 MCG tablet; Take 1 tablet (75 mcg) by mouth daily    Elevated cholesterol  -     VENOUS COLLECTION  -     Lipid Profile ([facility])  -     simvastatin (ZOCOR) 20 MG tablet; Take 1 tablet (20 mg) by mouth daily    Pre-diabetes  -     Hemoglobin A1C ([facility])    Right foot pain  No acute pathology on xray. Likely mechanical. Discussed foot wear.   -     XR Foot Right G/E 3 Views; Future

## 2023-05-25 LAB
HBA1C MFR BLD: 5.8 % (ref 4.8–5.6)
TSH BLD-ACNC: 5.01 UIU/ML (ref 0.45–4.5)

## 2023-05-25 RX ORDER — LEVOTHYROXINE SODIUM 88 UG/1
88 TABLET ORAL DAILY
Qty: 90 TABLET | Refills: 3 | Status: SHIPPED | OUTPATIENT
Start: 2023-05-25 | End: 2024-05-30

## 2023-06-02 ENCOUNTER — TELEPHONE (OUTPATIENT)
Dept: FAMILY MEDICINE | Facility: CLINIC | Age: 68
End: 2023-06-02

## 2023-06-02 NOTE — TELEPHONE ENCOUNTER
Reminder given to recheck thyroid  As noted from 5/24/23 lab note from AM -LAB ONLY -left message to call back  Due in mid July 2023    Jacqueline Suarez MA June 2, 2023 1:08 PM

## 2023-07-27 DIAGNOSIS — E03.9 ACQUIRED HYPOTHYROIDISM: Primary | ICD-10-CM

## 2023-07-27 LAB — TSH SERPL DL<=0.005 MIU/L-ACNC: 2.4 UIU/ML (ref 0.3–4.2)

## 2023-07-27 PROCEDURE — 36415 COLL VENOUS BLD VENIPUNCTURE: CPT

## 2023-07-27 PROCEDURE — 84443 ASSAY THYROID STIM HORMONE: CPT | Performed by: FAMILY MEDICINE

## 2023-07-27 PROCEDURE — 36415 COLL VENOUS BLD VENIPUNCTURE: CPT | Performed by: FAMILY MEDICINE

## 2023-09-13 PROCEDURE — G0008 ADMIN INFLUENZA VIRUS VAC: HCPCS | Performed by: FAMILY MEDICINE

## 2023-09-13 PROCEDURE — 90694 VACC AIIV4 NO PRSRV 0.5ML IM: CPT | Performed by: FAMILY MEDICINE

## 2024-03-19 ENCOUNTER — OFFICE VISIT (OUTPATIENT)
Dept: FAMILY MEDICINE | Facility: CLINIC | Age: 69
End: 2024-03-19

## 2024-03-19 VITALS
DIASTOLIC BLOOD PRESSURE: 81 MMHG | WEIGHT: 183.6 LBS | HEIGHT: 63 IN | TEMPERATURE: 98.6 F | BODY MASS INDEX: 32.53 KG/M2 | HEART RATE: 75 BPM | OXYGEN SATURATION: 99 % | SYSTOLIC BLOOD PRESSURE: 129 MMHG

## 2024-03-19 DIAGNOSIS — J04.0 LARYNGITIS: Primary | ICD-10-CM

## 2024-03-19 PROCEDURE — 99213 OFFICE O/P EST LOW 20 MIN: CPT | Performed by: FAMILY MEDICINE

## 2024-03-19 NOTE — PROGRESS NOTES
"SUBJECTIVE:    Monica Bryson, is a 69 year old female presenting for the below:     1. 3 day h/o sore throat with sensation of dryness in throat with mild hoarse voice. Recent coryzal symptoms started last week and these are now settling. Denies shortness of breath, no fevers or chills. Good appetite throughout.     Tested negative COVID 19 at home. Non smoker.       OBJECTIVE:  Vitals:    03/19/24 1550 03/19/24 1555   BP: (!) 137/96 129/81   Pulse: 75    Temp: 98.6  F (37  C)    TempSrc: Oral    SpO2: 99%    Weight: 83.3 kg (183 lb 9.6 oz)    Height: 1.588 m (5' 2.5\")     Body mass index is 33.05 kg/m .  General: no acute distress, cooperative with exam, hoarse voice.  Eyes: no injection or drainage.  Ears: TM's and canals show no erythema, discharge, or effusion bilaterally.  Throat: moist mucous membranes, no tonsillar exudate or hypertrophy, posterior oral pharynx non-erythematous without lesions.  Neck: supple, no thyroid nodules or enlargement.  Lungs: clear to auscultation bilaterally, normal respiratory effort.  Heart: regular rate, normal S1 and S2, no murmurs.       ASSESSMENT / PLAN:      Laryngitis  Viral aetiology and lack of indication for Abx discussed.   Symptomatic management discussed inclusive of good hydration, throat lozenges, humidifier by the bed. .     "

## 2024-04-18 ENCOUNTER — HOSPITAL ENCOUNTER (OUTPATIENT)
Dept: MAMMOGRAPHY | Facility: CLINIC | Age: 69
Discharge: HOME | End: 2024-04-18
Attending: FAMILY MEDICINE | Admitting: FAMILY MEDICINE
Payer: COMMERCIAL

## 2024-04-18 DIAGNOSIS — Z12.31 VISIT FOR SCREENING MAMMOGRAM: ICD-10-CM

## 2024-04-18 PROCEDURE — 77063 BREAST TOMOSYNTHESIS BI: CPT

## 2024-05-28 NOTE — PATIENT INSTRUCTIONS
"Preventive Care Advice   This is general advice we often give to help people stay healthy. Your care team may have specific advice just for you. Please talk to your care team about your own preventive care needs.  Lifestyle  Exercise at least 150 minutes each week (30 minutes a day, 5 days a week).  Do muscle strengthening activities 2 days a week. These help control your weight and prevent disease.  No smoking.  Wear sunscreen to prevent skin cancer.  Have your home tested for radon every 2 to 5 years. Radon is a colorless, odorless gas that can harm your lungs. To learn more, go to www.health.[facility].[place]. and search for \"Radon in Homes.\"  Keep guns unloaded and locked up in a safe place like a safe or gun vault, or, use a gun lock and hide the keys. Always lock away bullets separately. To learn more, visit [employer].gov and search for \"safe gun storage.\"  Nutrition  Eat 5 or more servings of fruits and vegetables each day.  Try wheat bread, brown rice and whole grain pasta (instead of white bread, rice, and pasta).  Get enough calcium and vitamin D. Check the label on foods and aim for 100% of the RDA (recommended daily allowance).  Regular exams  Have a dental exam and cleaning every 6 months.  See your health care team every year to talk about:  Any changes in your health.  Any medicines your care team has prescribed.  Preventive care, family planning, and ways to prevent chronic diseases.  Shots (vaccines)   HPV shots (up to age 26), if you've never had them before.  Hepatitis B shots (up to age 59), if you've never had them before.  COVID-19 shot: Get this shot when it's due.  Flu shot: Get a flu shot every year.  Tetanus shot: Get a tetanus shot every 10 years.  Pneumococcal, hepatitis A, and RSV shots: Ask your care team if you need these based on your risk.  Shingles shot (for age 50 and up).  General health tests  Diabetes screening:  Starting at age 35, Get screened for diabetes at least every 3 years.  If " you are younger than age 35, ask your care team if you should be screened for diabetes.  Cholesterol test: At age 39, start having a cholesterol test every 5 years, or more often if advised.  Bone density scan (DEXA): At age 50, ask your care team if you should have this scan for osteoporosis (brittle bones).  Hepatitis C: Get tested at least once in your life.  Abdominal aortic aneurysm screening: Talk to your doctor about having this screening if you:  Have ever smoked; and  Are biologically male; and  Are between the ages of 65 and 75.  STIs (sexually transmitted infections)  Before age 24: Ask your care team if you should be screened for STIs.  After age 24: Get screened for STIs if you're at risk. You are at risk for STIs (including HIV) if:  You are sexually active with more than one person.  You don't use condoms every time.  You or a partner was diagnosed with a sexually transmitted infection.  If you are at risk for HIV, ask about PrEP medicine to prevent HIV.  Get tested for HIV at least once in your life, whether you are at risk for HIV or not.  Cancer screening tests  Cervical cancer screening: If you have a cervix, begin getting regular cervical cancer screening tests at age 21. Most people who have regular screenings with normal results can stop after age 65. Talk about this with your provider.  Breast cancer scan (mammogram): If you've ever had breasts, begin having regular mammograms starting at age 40. This is a scan to check for breast cancer.  Colon cancer screening: It is important to start screening for colon cancer at age 45.  Have a colonoscopy test every 10 years (or more often if you're at risk) Or, ask your provider about stool tests like a FIT test every year or Cologuard test every 3 years.  To learn more about your testing options, visit: www.Specpage/110702.pdf.  For help making a decision, visit: isabella/gp13602.  Prostate cancer screening test: If you have a prostate and are age 55  to 69, ask your provider if you would benefit from a yearly prostate cancer screening test.  Lung cancer screening: If you are a current or former smoker age 50 to 80, ask your care team if ongoing lung cancer screenings are right for you.  For informational purposes only. Not to replace the advice of your health care provider. Copyright   2023 [facility]. All rights reserved. Clinically reviewed by the [facility] Transitions Program. [employer] 276255 - REV 04/24.  Options for audiology (formal hearing testing)    [facility] [place]- [phone] [address]   [address]   [place] [address]     [facility]- [phone] [address]  [place] [address]     [facility]- [phone]  [address]   [address]   [place] [address]

## 2024-05-29 ENCOUNTER — OFFICE VISIT (OUTPATIENT)
Dept: FAMILY MEDICINE | Facility: CLINIC | Age: 69
End: 2024-05-29

## 2024-05-29 VITALS
SYSTOLIC BLOOD PRESSURE: 139 MMHG | HEIGHT: 63 IN | DIASTOLIC BLOOD PRESSURE: 82 MMHG | HEART RATE: 66 BPM | OXYGEN SATURATION: 100 % | WEIGHT: 185 LBS | BODY MASS INDEX: 32.78 KG/M2

## 2024-05-29 DIAGNOSIS — I10 BENIGN ESSENTIAL HYPERTENSION: ICD-10-CM

## 2024-05-29 DIAGNOSIS — Z00.00 ENCOUNTER FOR MEDICARE ANNUAL WELLNESS EXAM: Primary | ICD-10-CM

## 2024-05-29 DIAGNOSIS — E66.811 CLASS 1 OBESITY DUE TO EXCESS CALORIES WITH SERIOUS COMORBIDITY AND BODY MASS INDEX (BMI) OF 33.0 TO 33.9 IN ADULT: ICD-10-CM

## 2024-05-29 DIAGNOSIS — E03.9 ACQUIRED HYPOTHYROIDISM: ICD-10-CM

## 2024-05-29 DIAGNOSIS — E66.09 CLASS 1 OBESITY DUE TO EXCESS CALORIES WITH SERIOUS COMORBIDITY AND BODY MASS INDEX (BMI) OF 33.0 TO 33.9 IN ADULT: ICD-10-CM
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
## 2024-05-29 DIAGNOSIS — R94.120 FAILED HEARING SCREENING: ICD-10-CM

## 2024-05-29 DIAGNOSIS — E78.00 HYPERCHOLESTEREMIA: ICD-10-CM

## 2024-05-29 DIAGNOSIS — R73.01 IMPAIRED FASTING GLUCOSE: ICD-10-CM

## 2024-05-29 LAB
ANION GAP SERPL CALCULATED.3IONS-SCNC: 13 MMOL/L (ref 7–15)
BUN SERPL-MCNC: 8.8 MG/DL (ref 8–23)
CALCIUM SERPL-MCNC: 9.6 MG/DL (ref 8.8–10.2)
CHLORIDE SERPL-SCNC: 101 MMOL/L (ref 98–107)
CHOLESTEROL: 179 MG/DL (ref 100–199)
CREAT SERPL-MCNC: 0.67 MG/DL (ref 0.51–0.95)
DEPRECATED HCO3 PLAS-SCNC: 25 MMOL/L (ref 22–29)
EGFRCR SERPLBLD CKD-EPI 2021: >90 ML/MIN/1.73M2
FASTING STATUS PATIENT QL REPORTED: YES
FASTING?: YES
GLUCOSE SERPL-MCNC: 100 MG/DL (ref 70–99)
HBA1C MFR BLD: 6.1 %
HDL (RMG): 48 MG/DL (ref 40–?)
LDL CALCULATED (RMG): 101 MG/DL (ref 0–130)
POTASSIUM SERPL-SCNC: 3.7 MMOL/L (ref 3.4–5.3)
SODIUM SERPL-SCNC: 139 MMOL/L (ref 135–145)
TRIGLYCERIDES (RMG): 146 MG/DL (ref 0–149)
TSH SERPL DL<=0.005 MIU/L-ACNC: 4.58 UIU/ML (ref 0.3–4.2)

## 2024-05-29 PROCEDURE — 36415 COLL VENOUS BLD VENIPUNCTURE: CPT | Performed by: FAMILY MEDICINE

## 2024-05-29 PROCEDURE — 83036 HEMOGLOBIN GLYCOSYLATED A1C: CPT | Performed by: FAMILY MEDICINE

## 2024-05-29 PROCEDURE — 80048 BASIC METABOLIC PNL TOTAL CA: CPT | Performed by: FAMILY MEDICINE

## 2024-05-29 PROCEDURE — G0439 PPPS, SUBSEQ VISIT: HCPCS | Performed by: FAMILY MEDICINE

## 2024-05-29 PROCEDURE — 80061 LIPID PANEL: CPT | Mod: QW | Performed by: FAMILY MEDICINE

## 2024-05-29 PROCEDURE — 99214 OFFICE O/P EST MOD 30 MIN: CPT | Mod: 25 | Performed by: FAMILY MEDICINE

## 2024-05-29 PROCEDURE — 84443 ASSAY THYROID STIM HORMONE: CPT | Performed by: FAMILY MEDICINE

## 2024-05-29 RX ORDER — SIMVASTATIN 20 MG
20 TABLET ORAL DAILY
Qty: 90 TABLET | Refills: 3 | Status: SHIPPED | OUTPATIENT
Start: 2024-05-29

## 2024-05-29 RX ORDER — LEVOTHYROXINE SODIUM 75 UG/1
75 TABLET ORAL DAILY
COMMUNITY
End: 2024-05-29

## 2024-05-29 RX ORDER — AMLODIPINE BESYLATE 5 MG/1
5 TABLET ORAL DAILY
Qty: 90 TABLET | Refills: 3 | Status: SHIPPED | OUTPATIENT
Start: 2024-05-29

## 2024-05-29 RX ORDER — LEVOTHYROXINE SODIUM 75 UG/1
75 TABLET ORAL DAILY
Qty: 90 TABLET | Refills: 3 | Status: SHIPPED | OUTPATIENT
Start: 2024-05-29 | End: 2024-05-30

## 2024-05-29 RX ORDER — LEVOTHYROXINE SODIUM 88 UG/1
88 TABLET ORAL DAILY
Qty: 90 TABLET | Refills: 3 | Status: CANCELLED | OUTPATIENT
Start: 2024-05-29

## 2024-05-29 SDOH — HEALTH STABILITY: PHYSICAL HEALTH: ON AVERAGE, HOW MANY MINUTES DO YOU ENGAGE IN EXERCISE AT THIS LEVEL?: 60 MIN

## 2024-05-29 SDOH — HEALTH STABILITY: PHYSICAL HEALTH: ON AVERAGE, HOW MANY DAYS PER WEEK DO YOU ENGAGE IN MODERATE TO STRENUOUS EXERCISE (LIKE A BRISK WALK)?: 3 DAYS

## 2024-05-29 ASSESSMENT — SOCIAL DETERMINANTS OF HEALTH (SDOH): HOW OFTEN DO YOU GET TOGETHER WITH FRIENDS OR RELATIVES?: ONCE A WEEK

## 2024-05-29 NOTE — PROGRESS NOTES
"SUBJECTIVE:    This 69 year old female presents with the following concerns:     1. H/o sun exposure, No lesions of concern. Requesting derm referral for annual skin check.        Health Maintenance:  Health maintenance alerts were reviewed and updated as appropriate.  Breast cancer screening: UTD  Osteoporosis screening:  UTD  Colorectal cancer screening: UTD    OBJECTIVE:  Vitals:    05/29/24 0801 05/29/24 0805   BP: (!) 147/86 139/82   Pulse: 66    SpO2: 100%    Weight: 83.9 kg (185 lb)    Height: 1.588 m (5' 2.5\")     Body mass index is 33.3 kg/m .  General: no acute distress, cooperative with exam.  HEENT:  PERRLA. Bilateral TM's, external canals, oropharynx normal.    Neck:  Supple, no lymphadenopathy or thyromegaly   Lungs: clear to auscultation bilaterally, normal respiratory effort.  Heart:  RRR without murmurs, rubs or gallops.  Normal S1 and S2.  Abdomen: normal bowel sounds, nontender, no palpable organomegaly.  Skin:  No lesions.  No rashes.  Extremities: warm, perfused, no swelling or edema.  Neuro:  CN II-XII intact, motor & sensory function all intact.    Psych: mental status normal, mood and affect appropriate.        5/5/2020     8:28 AM 5/19/2021     9:32 AM 5/24/2023     8:18 AM   PHQ   PHQ-9 Total Score 6 6 7   Q9: Thoughts of better off dead/self-harm past 2 weeks Not at all Not at all Not at all       ASSESSMENT / PLAN:        Benign essential hypertension  at target of <140/90.  -     amLODIPine (NORVASC) 5 MG tablet; Take 1 tablet (5 mg) by mouth daily  -     Basic metabolic panel; Future    Acquired hypothyroidism  -     VENOUS COLLECTION  -     levothyroxine (SYNTHROID/LEVOTHROID) 75 MCG tablet; Take 1 tablet (75 mcg) by mouth daily  -     TSH; Future    Hypercholesteremia  -     Lipid Profile (RMG)  -     simvastatin (ZOCOR) 20 MG tablet; Take 1 tablet (20 mg) by mouth daily    Mild sun exposure, sequela  -     Adult Dermatology  Referral; Future    Class 1 obesity due to excess " calories with serious comorbidity and body mass index (BMI) of 33.0 to 33.9 in adult  Impaired fasting glucose.   -     Hemoglobin A1c; Future

## 2024-05-29 NOTE — PROGRESS NOTES
5/29/2024   General Health   How would you rate your overall physical health? Good   Feel stress (tense, anxious, or unable to sleep) Only a little   (!) STRESS CONCERN      5/29/2024   Nutrition   Diet: Vegetarian/vegan         5/29/2024   Exercise   Days per week of moderate/strenous exercise 3 days   Average minutes spent exercising at this level 60 min         5/29/2024   Social Factors   Frequency of gathering with friends or relatives Once a week   Worry food won't last until get money to buy more No   Food not last or not have enough money for food? No   Do you have housing?  No   Are you worried about losing your housing? Yes   Lack of transportation? No   Unable to get utilities (heat,electricity)? No   Want help with housing or utility concern? No   (!) HOUSING CONCERN PRESENT      5/29/2024   Fall Risk   Fallen 2 or more times in the past year? No    No   Trouble with walking or balance? Yes    Yes         5/29/2024   Activities of Daily Living- Home Safety   Needs help with the following daily activites None of the above   Safety concerns in the home None of the above         5/29/2024   Dental   Dentist two times every year? (!) NO         5/29/2024   Hearing Screening   Hearing concerns? (!) I NEED TO ASK PEOPLE TO SPEAK UP OR REPEAT THEMSELVES.    (!) IT'S HARD TO FOLLOW A CONVERSATION IN A NOISY RESTAURANT OR CROWDED ROOM.    (!) TROUBLE UNDERSTANDING SOFT OR WHISPERED SPEECH.     HEARING FREQUENCY:     Right Ear:     500 Hz : Pass   1000 Hz: Pass   2000 Hz: Fail   4000 Hz: Pass  Left Ear:     500 Hz : Pass   1000 Hz: Pass   2000 Hz: Fail   4000 Hz: Pass        5/29/2024   Driving Risk Screening   Patient/family members have concerns about driving No         5/29/2024   General Alertness/Fatigue Screening   Have you been more tired than usual lately? No         5/29/2024   Urinary Incontinence Screening   Bothered by leaking urine in past 6 months No         5/29/2024   TB Screening   Were you  born outside of the [place]? Yes     Today's PHQ-2 Score:       5/29/2024     8:02 AM   PHQ-2 ( 1999 [employer])   Q1: Little interest or pleasure in doing things 0   Q2: Feeling down, depressed or hopeless 0   PHQ-2 Score 0         5/29/2024   Substance Use   Alcohol more than 3/day or more than 7/wk No   Do you have a current opioid prescription? No   How severe/bad is pain from 1 to 10? 0/10 (No Pain)   Do you use any other substances recreationally? No     Social History     Tobacco Use    Smoking status: Never    Smokeless tobacco: Never   Substance Use Topics    Alcohol use: Yes     Alcohol/week: 0.0 standard drinks of alcohol     Comment: not at all    Drug use: No         4/18/2024   LAST FHS-7 RESULTS   1st degree relative breast or ovarian cancer No   Any relative bilateral breast cancer No   Any male have breast cancer No   Any ONE woman have BOTH breast AND ovarian cancer No   Any woman with breast cancer before 50yrs No   2 or more relatives with breast AND/OR ovarian cancer No   2 or more relatives with breast AND/OR bowel cancer No           5/29/2024   Mini Cog   Clock Draw Score 2 Normal   3 Item Recall 2 objects recalled   Mini Cog Total Score 4         Encounter for Medicare annual wellness exam    Failed hearing screening  -     Adult Audiology  Referral; Future

## 2024-05-30 RX ORDER — LEVOTHYROXINE SODIUM 100 UG/1
100 TABLET ORAL DAILY
Qty: 90 TABLET | Refills: 3 | Status: SHIPPED | OUTPATIENT
Start: 2024-05-30 | End: 2024-05-30

## 2024-05-30 RX ORDER — LEVOTHYROXINE SODIUM 88 UG/1
88 TABLET ORAL DAILY
Qty: 90 TABLET | Refills: 3 | Status: SHIPPED | OUTPATIENT
Start: 2024-05-30

## 2024-09-25 PROCEDURE — 90653 IIV ADJUVANT VACCINE IM: CPT

## 2024-09-25 PROCEDURE — G0008 ADMIN INFLUENZA VIRUS VAC: HCPCS

## 2024-12-11 ENCOUNTER — OFFICE VISIT (OUTPATIENT)
Dept: FAMILY MEDICINE | Facility: CLINIC | Age: 69
End: 2024-12-11

## 2024-12-11 VITALS
WEIGHT: 184.4 LBS | BODY MASS INDEX: 33.19 KG/M2 | HEART RATE: 70 BPM | OXYGEN SATURATION: 96 % | SYSTOLIC BLOOD PRESSURE: 133 MMHG | DIASTOLIC BLOOD PRESSURE: 78 MMHG

## 2024-12-11 DIAGNOSIS — R39.9 LOWER URINARY TRACT SYMPTOMS (LUTS): Primary | ICD-10-CM

## 2024-12-11 DIAGNOSIS — R73.03 PRE-DIABETES: ICD-10-CM

## 2024-12-11 DIAGNOSIS — R35.0 INCREASED FREQUENCY OF URINATION: ICD-10-CM

## 2024-12-11 DIAGNOSIS — E03.9 ACQUIRED HYPOTHYROIDISM: ICD-10-CM

## 2024-12-11 LAB
BACTERIA (RMG): ABNORMAL
BILIRUBIN (RMG): NEGATIVE
BLOOD (RMG): NEGATIVE
CASTS (RMG): ABNORMAL
COLOR UR: YELLOW
CRYSTAL (RMG): ABNORMAL
EPITHELIAL (RMG): ABNORMAL
EST. AVERAGE GLUCOSE BLD GHB EST-MCNC: 123 MG/DL
GLUCOSE (RMG): NEGATIVE
HBA1C MFR BLD: 5.9 %
KETONE (RMG): NEGATIVE
LEUKOCYTE (RMG): ABNORMAL
MUCOUS (RMG): ABNORMAL
NITRITE (RMG): NEGATIVE
PH UR STRIP: 6 PH (ref 5–7)
PROTEIN (RMG): NEGATIVE
RBC (RMG): ABNORMAL
SP GR UR STRIP: 1.01
TSH SERPL DL<=0.005 MIU/L-ACNC: 4.93 UIU/ML (ref 0.3–4.2)
UROBILINOGEN (RMG): 0.2
WBC (RMG): ABNORMAL

## 2024-12-11 PROCEDURE — 81003 URINALYSIS AUTO W/O SCOPE: CPT | Performed by: FAMILY MEDICINE

## 2024-12-11 PROCEDURE — 87086 URINE CULTURE/COLONY COUNT: CPT | Performed by: FAMILY MEDICINE

## 2024-12-11 PROCEDURE — 36415 COLL VENOUS BLD VENIPUNCTURE: CPT | Performed by: FAMILY MEDICINE

## 2024-12-11 PROCEDURE — 99214 OFFICE O/P EST MOD 30 MIN: CPT | Performed by: FAMILY MEDICINE

## 2024-12-11 PROCEDURE — 84443 ASSAY THYROID STIM HORMONE: CPT | Mod: 90 | Performed by: FAMILY MEDICINE

## 2024-12-11 PROCEDURE — 83036 HEMOGLOBIN GLYCOSYLATED A1C: CPT | Performed by: FAMILY MEDICINE

## 2024-12-11 PROCEDURE — G2211 COMPLEX E/M VISIT ADD ON: HCPCS | Performed by: FAMILY MEDICINE

## 2024-12-11 NOTE — PROGRESS NOTES
SUBJECTIVE:    Monica Bryson, is a 69 year old female presenting for the below:     -Pre DM ; due recheck. Has reduced sugar in diet.     Lab Results   Component Value Date    A1C 6.1 05/29/2024    A1C 5.8 05/24/2023    A1C 5.9 05/23/2022    A1C 6.1 05/19/2021    A1C 6.1 05/05/2020    A1C 5.6 12/31/2018     -Increased urinary frequency and mild dysuria. Unsure how long for. Denies fevers or chills. No back or flank pain. No vaginal discharge.     OBJECTIVE:  Vitals:    12/11/24 0809   BP: 133/78   Pulse: 70   SpO2: 96%   Weight: 83.6 kg (184 lb 6.4 oz)    Body mass index is 33.19 kg/m .  General: no acute distress, cooperative with exam.  Psych: mental status normal, mood and affect appropriate.      Results for orders placed or performed in visit on 12/11/24 (from the past 24 hours)   Urinalysis (RMG)   Result Value Ref Range    Color Urine Yellow (A)     pH Urine 6.0 pH    Specific Gravity Urine 1.010     PROTEIN (RMG) Negative Negative    GLUCOSE (RMG) Negative Negative    KETONE (RMG) Negative Negative    LEUKOCYTE (RMG) 2+ (A) Negative    BLOOD (RMG) Negative Negative    Nitrite (RMG) Negative Negative    BILIRUBIN (RMG) Negative Negative    UROBILINOGEN (RMG) 0.2 0.2 - 1    WBC (RMG) 3-5 (A) None    RBC (RMG) Rare (A) None    CRYSTAL (RMG) None None    BACTERIA (RMG) Few (A) None    MUCOUS (RMG) None None    CASTS (RMG) None None    EPITHELIAL (RMG) Few        ASSESSMENT / PLAN:        Lower urinary tract symptoms (LUTS)  Increased frequency of urination  U/a not strongly indicative of UTI. Suspect contaminant. Symptoms mild, intermittent and more chronic. Await urine culture : patient in agreement.   -     Urinalysis (RMG)  -     Urine Culture; Future    Pre-diabetes  due recheck. Has reduced sugar in diet.   -     VENOUS COLLECTION  -     Hemoglobin A1c; Future    Acquired hypothyroidism  Synthroid 88 mcg daily.   -     TSH      The longitudinal plan of care for the diagnosis(es)/condition(s) as  documented were addressed during this visit. Due to the added complexity in care, I will continue to support Monica in the subsequent management and with ongoing continuity of care.

## 2024-12-12 LAB — BACTERIA UR CULT: NORMAL

## 2024-12-12 RX ORDER — LEVOTHYROXINE SODIUM 100 UG/1
100 TABLET ORAL DAILY
Qty: 90 TABLET | Refills: 3 | Status: SHIPPED | OUTPATIENT
Start: 2024-12-12

## 2025-05-05 ENCOUNTER — HOSPITAL ENCOUNTER (OUTPATIENT)
Dept: MAMMOGRAPHY | Facility: CLINIC | Age: 70
Discharge: HOME | End: 2025-05-05
Attending: FAMILY MEDICINE | Admitting: FAMILY MEDICINE
Payer: COMMERCIAL

## 2025-05-05 DIAGNOSIS — Z12.31 VISIT FOR SCREENING MAMMOGRAM: ICD-10-CM

## 2025-05-05 PROCEDURE — 77067 SCR MAMMO BI INCL CAD: CPT

## 2025-07-08 NOTE — PATIENT INSTRUCTIONS
Patient Education   Preventive Care Advice   This is general advice given by our system to help you stay healthy. However, your care team may have specific advice just for you. Please talk to your care team about your preventive care needs.  Nutrition  Eat 5 or more servings of fruits and vegetables each day.  Try wheat bread, brown rice and whole grain pasta (instead of white bread, rice, and pasta).  Get enough calcium and vitamin D. Check the label on foods and aim for 100% of the RDA (recommended daily allowance).  Lifestyle  Exercise at least 150 minutes each week  (30 minutes a day, 5 days a week).  Do muscle strengthening activities 2 days a week. These help control your weight and prevent disease.  No smoking.  Wear sunscreen to prevent skin cancer.  Have a dental exam and cleaning every 6 months.  Yearly exams  See your health care team every year to talk about:  Any changes in your health.  Any medicines your care team has prescribed.  Preventive care, family planning, and ways to prevent chronic diseases.  Shots (vaccines)   HPV shots (up to age 26), if you've never had them before.  Hepatitis B shots (up to age 59), if you've never had them before.  COVID-19 shot: Get this shot when it's due.  Flu shot: Get a flu shot every year.  Tetanus shot: Get a tetanus shot every 10 years.  Pneumococcal, hepatitis A, and RSV shots: Ask your care team if you need these based on your risk.  Shingles shot (for age 50 and up)  General health tests  Diabetes screening:  Starting at age 35, Get screened for diabetes at least every 3 years.  If you are younger than age 35, ask your care team if you should be screened for diabetes.  Cholesterol test: At age 39, start having a cholesterol test every 5 years, or more often if advised.  Bone density scan (DEXA): At age 50, ask your care team if you should have this scan for osteoporosis (brittle bones).  Hepatitis C: Get tested at least once in your life.  STIs (sexually  transmitted infections)  Before age 24: Ask your care team if you should be screened for STIs.  After age 24: Get screened for STIs if you're at risk. You are at risk for STIs (including HIV) if:  You are sexually active with more than one person.  You don't use condoms every time.  You or a partner was diagnosed with a sexually transmitted infection.  If you are at risk for HIV, ask about PrEP medicine to prevent HIV.  Get tested for HIV at least once in your life, whether you are at risk for HIV or not.  Cancer screening tests  Cervical cancer screening: If you have a cervix, begin getting regular cervical cancer screening tests starting at age 21.  Breast cancer scan (mammogram): If you've ever had breasts, begin having regular mammograms starting at age 40. This is a scan to check for breast cancer.  Colon cancer screening: It is important to start screening for colon cancer at age 45.  Have a colonoscopy test every 10 years (or more often if you're at risk) Or, ask your provider about stool tests like a FIT test every year or Cologuard test every 3 years.  To learn more about your testing options, visit:   .  For help making a decision, visit:   https://bit.ly/za54254.  Prostate cancer screening test: If you have a prostate, ask your care team if a prostate cancer screening test (PSA) at age 55 is right for you.  Lung cancer screening: If you are a current or former smoker ages 50 to 80, ask your care team if ongoing lung cancer screenings are right for you.  For informational purposes only. Not to replace the advice of your health care provider. Copyright   2023 [facility]. All rights reserved. Clinically reviewed by the [facility] Transitions Program. [employer] 428989 - REV 01/24.    Options for audiology (formal hearing testing)    [facility] [place]- [phone] [address]   [address] [place] [address] [id number] [place] [address] [facility]- [phone] [address]    [address]  [place] [address]     [facility]- [phone]  [address]   [address]   [place] [address]          Try a steroid nasal spray (such as Flonase or Nasonex)   -1 puff into each nostril twice per day  -can take 7 to 10 days to have full effect

## 2025-07-10 ENCOUNTER — OFFICE VISIT (OUTPATIENT)
Dept: FAMILY MEDICINE | Facility: CLINIC | Age: 70
End: 2025-07-10

## 2025-07-10 VITALS
DIASTOLIC BLOOD PRESSURE: 83 MMHG | HEART RATE: 70 BPM | WEIGHT: 183.8 LBS | TEMPERATURE: 97.6 F | BODY MASS INDEX: 33.08 KG/M2 | OXYGEN SATURATION: 98 % | SYSTOLIC BLOOD PRESSURE: 127 MMHG

## 2025-07-10 DIAGNOSIS — Z00.00 ANNUAL PHYSICAL EXAM: ICD-10-CM

## 2025-07-10 DIAGNOSIS — Z00.00 ENCOUNTER FOR MEDICARE ANNUAL WELLNESS EXAM: Primary | ICD-10-CM

## 2025-07-10 DIAGNOSIS — B37.31 VAGINAL CANDIDIASIS: ICD-10-CM

## 2025-07-10 DIAGNOSIS — E03.9 ACQUIRED HYPOTHYROIDISM: ICD-10-CM

## 2025-07-10 DIAGNOSIS — R73.03 PRE-DIABETES: ICD-10-CM

## 2025-07-10 DIAGNOSIS — M85.89 OSTEOPENIA OF MULTIPLE SITES: ICD-10-CM

## 2025-07-10 DIAGNOSIS — I10 BENIGN ESSENTIAL HYPERTENSION: ICD-10-CM

## 2025-07-10 DIAGNOSIS — R39.9 LOWER URINARY TRACT SYMPTOMS (LUTS): ICD-10-CM

## 2025-07-10 DIAGNOSIS — E78.00 HYPERCHOLESTEREMIA: ICD-10-CM

## 2025-07-10 DIAGNOSIS — R94.120 FAILED HEARING SCREENING: ICD-10-CM

## 2025-07-10 LAB
ANION GAP SERPL CALCULATED.3IONS-SCNC: 12 MMOL/L (ref 7–15)
BACTERIA (RMG): ABNORMAL
BILIRUBIN (RMG): NEGATIVE
BLOOD (RMG): NEGATIVE
BUN SERPL-MCNC: 10 MG/DL (ref 8–23)
CALCIUM SERPL-MCNC: 9.9 MG/DL (ref 8.8–10.4)
CASTS (RMG): ABNORMAL
CHLORIDE SERPL-SCNC: 101 MMOL/L (ref 98–107)
CHOLESTEROL: 198 MG/DL (ref 100–199)
COLOR UR: YELLOW
CREAT SERPL-MCNC: 0.61 MG/DL (ref 0.51–0.95)
CRYSTAL (RMG): ABNORMAL
EGFRCR SERPLBLD CKD-EPI 2021: >90 ML/MIN/1.73M2
EPITHELIAL (RMG): ABNORMAL
EST. AVERAGE GLUCOSE BLD GHB EST-MCNC: 126 MG/DL
FASTING STATUS PATIENT QL REPORTED: YES
FASTING?: YES
GLUCOSE (RMG): NEGATIVE
GLUCOSE SERPL-MCNC: 99 MG/DL (ref 70–99)
HBA1C MFR BLD: 6 %
HCO3 SERPL-SCNC: 27 MMOL/L (ref 22–29)
HDL (RMG): 45 MG/DL (ref 40–?)
KETONE (RMG): NEGATIVE
LDL CALCULATED (RMG): 109 MG/DL (ref 0–130)
LEUKOCYTE (RMG): ABNORMAL
MUCOUS (RMG): ABNORMAL
NITRITE (RMG): NEGATIVE
PH UR STRIP: 7.5 PH (ref 5–7)
POTASSIUM SERPL-SCNC: 4.2 MMOL/L (ref 3.4–5.3)
PROTEIN (RMG): NEGATIVE
RBC (RMG): ABNORMAL
SODIUM SERPL-SCNC: 140 MMOL/L (ref 135–145)
SP GR UR STRIP: 1.01
TRIGLYCERIDES (RMG): 218 MG/DL (ref 0–149)
TSH SERPL DL<=0.005 MIU/L-ACNC: 5.27 UIU/ML (ref 0.3–4.2)
UROBILINOGEN (RMG): 0.2
WBC (RMG): ABNORMAL

## 2025-07-10 PROCEDURE — 83036 HEMOGLOBIN GLYCOSYLATED A1C: CPT | Mod: ORL | Performed by: FAMILY MEDICINE

## 2025-07-10 PROCEDURE — 36415 COLL VENOUS BLD VENIPUNCTURE: CPT | Performed by: FAMILY MEDICINE

## 2025-07-10 PROCEDURE — 80048 BASIC METABOLIC PNL TOTAL CA: CPT | Mod: ORL | Performed by: FAMILY MEDICINE

## 2025-07-10 PROCEDURE — 84443 ASSAY THYROID STIM HORMONE: CPT | Mod: ORL | Performed by: FAMILY MEDICINE

## 2025-07-10 RX ORDER — OLOPATADINE HYDROCHLORIDE 2 MG/ML
1 SOLUTION OPHTHALMIC DAILY
COMMUNITY
Start: 2025-06-16

## 2025-07-10 RX ORDER — LEVOTHYROXINE SODIUM 100 UG/1
100 TABLET ORAL DAILY
Qty: 90 TABLET | Refills: 3 | Status: SHIPPED | OUTPATIENT
Start: 2025-07-10 | End: 2025-07-10

## 2025-07-10 RX ORDER — FLUCONAZOLE 150 MG/1
150 TABLET ORAL ONCE
Qty: 1 TABLET | Refills: 0 | Status: SHIPPED | OUTPATIENT
Start: 2025-07-10 | End: 2025-07-10

## 2025-07-10 RX ORDER — LEVOTHYROXINE SODIUM 112 UG/1
112 TABLET ORAL
Qty: 90 TABLET | Refills: 3 | Status: SHIPPED | OUTPATIENT
Start: 2025-07-10

## 2025-07-10 RX ORDER — SIMVASTATIN 20 MG
20 TABLET ORAL DAILY
Qty: 90 TABLET | Refills: 3 | Status: SHIPPED | OUTPATIENT
Start: 2025-07-10

## 2025-07-10 RX ORDER — AMLODIPINE BESYLATE 5 MG/1
5 TABLET ORAL DAILY
Qty: 90 TABLET | Refills: 3 | Status: SHIPPED | OUTPATIENT
Start: 2025-07-10

## 2025-07-10 SDOH — HEALTH STABILITY: PHYSICAL HEALTH: ON AVERAGE, HOW MANY DAYS PER WEEK DO YOU ENGAGE IN MODERATE TO STRENUOUS EXERCISE (LIKE A BRISK WALK)?: 3 DAYS

## 2025-07-10 ASSESSMENT — SOCIAL DETERMINANTS OF HEALTH (SDOH): HOW OFTEN DO YOU GET TOGETHER WITH FRIENDS OR RELATIVES?: ONCE A WEEK

## 2025-07-10 NOTE — PROGRESS NOTES
Question 7/10/2025  8:05 AM CDT - Filed by Patient    In general, how would you rate your overall physical health? (!) FAIR    Do you have a special diet?  Vegetarian/vegan    On average, how many days per week do you engage in moderate to strenuous exercise (like a brisk walk)? 3 days    On average, how many minutes do you engage in exercise at this level?       How often do you get together with friends or relatives? Once a week    Have you fallen 2 or more times in the past year? No    Trouble with walking or balance? Yes    Do you see a dentist two times every year? (!) NO    Do you feel stress - tense, restless, nervous, or anxious, or unable to sleep at night because your mind is troubled all the time - these days? Only a little    Have you been more tired than usual lately? No    If you drink alcohol, do you typically have more than 3 drinks per day OR more than 7 drinks per week? No    Do you have a current opioid prescription (for example: hydrocodone, oxycodone, tramadol, or codeine)? No    How bad is your pain on scale from 1 to 10 (with 10 being the worst)? 1/10    Do you use any substances not prescribed by a provider, out of habit or for other reasons? No    Within the past 12 months, did you worry that your food would run out before you got money to buy more? No    Within the past 12 months, did the food you bought just not last and you didn t have money to get more? No    Do you have housing? (Housing is defined as stable permanent housing and does not include staying outside in a car, in a tent, in an abandoned building, in an overnight shelter, or couch-surfing.) Yes    Are you worried about losing your housing? Yes    Within the past 12 months, has lack of transportation kept you from medical appointments, getting your medicines, non-medical meetings or appointments, work, or from getting things that you need? No    Within the past 12 months, have you or your family members you live with been  unable to get utilities (heat, electricity) when it was really needed? No    Would you like to be contacted by your care team to help with housing and/or utility needs? No    Do you need help with any of these daily activities: None of the above    Which of the following safety concerns are present in your home? None of the above    Do you (or your family members) have any concerns about your safety while driving? No    In the past 6 months, have you been bothered by leaking of urine? No    Do you have any of the following hearing concerns? (!) I NEED TO ASK PEOPLE TO SPEAK UP OR REPEAT THEMSELVES.     (!) TROUBLE UNDERSTANDING SOFT OR WHISPERED SPEECH.    Would you like a referral for hearing testing? No     Hearing Screen  Left ear:  500Hz - FAIL  1000Hz - Pass  2000Hz - FAIL  4000Hz - Pass    Right ear:  500Hz - Pass  1000Hz - Pass  2000Hz - FAIL  4000Hz - Pass        5/29/2024     8:15 AM 7/10/2025     8:11 AM   MINI COG   Clock Draw Score 2 Normal 2 Normal   3 Item Recall 2 objects recalled 3 objects recalled   Mini Cog Total Score 4 5       Encounter for Medicare annual wellness exam    Failed hearing screening  Discussed formal audiology. Options for testing issued on AVS.

## 2025-07-10 NOTE — PROGRESS NOTES
SUBJECTIVE:    This 70 year old female presents with the following concerns:     - 1 month h/o of intermittent vaginal irritation and burning on urination. No increased frequency. No fevers or chills, destiny hematuria. Feels reminiscent to patient of prior vaginal yeast infections.     Health Maintenance:  Health maintenance alerts were reviewed and updated as appropriate.  Breast cancer screening: [facility] [[email or web address]]   Osteoporosis screening: osteopenia on DEXA June 2022 : due 3 year follow up now.   Colorectal cancer screening: UTD 7 year follow up     OBJECTIVE:  Vitals:    07/10/25 0813 07/10/25 0815   BP: (!) 137/93 127/83   Pulse: 70    Temp:  97.6  F (36.4  C)   TempSrc:  Oral   SpO2: 98%    Weight: 83.4 kg (183 lb 12.8 oz)     Body mass index is 33.08 kg/m .  General: no acute distress, cooperative with exam.  HEENT:  PERRLA. Bilateral TM's, external canals, oropharynx normal.    Neck:  Supple, no lymphadenopathy or thyromegaly   Lungs: clear to auscultation bilaterally, normal respiratory effort.  Heart:  RRR without murmurs, rubs or gallops.  Normal S1 and S2.  Abdomen: normal bowel sounds, nontender, no palpable organomegaly.  Skin:  No lesions.  No rashes.  Extremities: warm, perfused, no swelling or edema.  Neuro:  CN II-XII intact, motor & sensory function all intact.    Psych: mental status normal, mood and affect appropriate.      Recent Results (from the past 24 hours)   Urinalysis (RMG)   Result Value Ref Range    Color Urine Yellow     pH Urine 7.5 pH    Specific Gravity Urine 1.010     PROTEIN (RMG) Negative Negative    GLUCOSE (RMG) Negative Negative    KETONE (RMG) Negative Negative    LEUKOCYTE (RMG) Trace (A) Negative    BLOOD (RMG) Negative Negative    Nitrite (RMG) Negative Negative    BILIRUBIN (RMG) Negative Negative    UROBILINOGEN (RMG) 0.2 0.2 - 1    WBC (RMG) Rare (A) None    RBC (RMG) None None    CRYSTAL (RMG) None None    BACTERIA (RMG) None None    MUCOUS (RMG) None None    CASTS (RMG) None None     EPITHELIAL (RMG) Rare              5/5/2020     8:28 AM 5/19/2021     9:32 AM 5/24/2023     8:18 AM   PHQ   PHQ-9 Total Score 6 6 7   Q9: Thoughts of better off dead/self-harm past 2 weeks Not at all Not at all Not at all       ASSESSMENT / PLAN:     Annual physical exam  -     Basic metabolic panel; Future  -     Lipid Profile (RMG)  -     VENOUS COLLECTION     Vaginal candidiasis  Lower urinary tract symptoms (LUTS)  In keeping with vaginal and urethral irritation secondary to vaginal yeast infection. Ua not indication for bacterial cystitis. If symptoms do not improve with diflucan to return to clinic for pelvic exam and swabs.   -     fluconazole (DIFLUCAN) 150 MG tablet; Take 1 tablet (150 mg) by mouth once for 1 dose.  -     Urinalysis (RMG)    Osteopenia of multiple sites  osteopenia on DEXA June 2022 : due 3 year follow up now.   -     DEXA HIP/PELVIS/SPINE - Future; Future    Pre-diabetes  Lab Results   Component Value Date    A1C 5.9 12/11/2024    A1C 6.1 05/29/2024    A1C 5.8 05/24/2023     -     Hemoglobin A1c; Future    Acquired hypothyroidism  -     levothyroxine (SYNTHROID/LEVOTHROID) 100 MCG tablet; Take 1 tablet (100 mcg) by mouth daily.  -     TSH; Future    Benign essential hypertension  at target of <140/90.  -     amLODIPine (NORVASC) 5 MG tablet; Take 1 tablet (5 mg) by mouth daily.    Hypercholesteremia  -     simvastatin (ZOCOR) 20 MG tablet; Take 1 tablet (20 mg) by mouth daily.      The longitudinal plan of care for the diagnosis(es)/condition(s) as documented were addressed during this visit. Due to the added complexity in care, I will continue to support Monica in the subsequent management and with ongoing continuity of care.

## 2025-07-24 ENCOUNTER — HOSPITAL ENCOUNTER (OUTPATIENT)
Dept: BONE DENSITY | Facility: CLINIC | Age: 70
Discharge: HOME | End: 2025-07-24
Attending: FAMILY MEDICINE
Payer: COMMERCIAL

## 2025-07-24 DIAGNOSIS — M85.89 OSTEOPENIA OF MULTIPLE SITES: ICD-10-CM

## 2025-07-24 PROCEDURE — 77080 DXA BONE DENSITY AXIAL: CPT
